# Patient Record
Sex: MALE | Race: WHITE | HISPANIC OR LATINO | Employment: UNEMPLOYED | URBAN - METROPOLITAN AREA
[De-identification: names, ages, dates, MRNs, and addresses within clinical notes are randomized per-mention and may not be internally consistent; named-entity substitution may affect disease eponyms.]

---

## 2017-01-03 ENCOUNTER — TRANSCRIBE ORDERS (OUTPATIENT)
Dept: LAB | Facility: HOSPITAL | Age: 57
End: 2017-01-03

## 2017-01-03 ENCOUNTER — APPOINTMENT (OUTPATIENT)
Dept: LAB | Facility: HOSPITAL | Age: 57
End: 2017-01-03
Attending: INTERNAL MEDICINE
Payer: COMMERCIAL

## 2017-01-03 DIAGNOSIS — I10 ESSENTIAL (PRIMARY) HYPERTENSION: ICD-10-CM

## 2017-01-03 LAB
ALBUMIN SERPL BCP-MCNC: 3.9 G/DL (ref 3.5–5)
ALP SERPL-CCNC: 53 U/L (ref 46–116)
ALT SERPL W P-5'-P-CCNC: 42 U/L (ref 12–78)
ANION GAP SERPL CALCULATED.3IONS-SCNC: 8 MMOL/L (ref 4–13)
AST SERPL W P-5'-P-CCNC: 24 U/L (ref 5–45)
BILIRUB SERPL-MCNC: 0.84 MG/DL (ref 0.2–1)
BUN SERPL-MCNC: 14 MG/DL (ref 5–25)
CALCIUM SERPL-MCNC: 8.7 MG/DL (ref 8.3–10.1)
CHLORIDE SERPL-SCNC: 99 MMOL/L (ref 100–108)
CHOLEST SERPL-MCNC: 305 MG/DL (ref 50–200)
CO2 SERPL-SCNC: 28 MMOL/L (ref 21–32)
CREAT SERPL-MCNC: 0.87 MG/DL (ref 0.6–1.3)
GFR SERPL CREATININE-BSD FRML MDRD: >60 ML/MIN/1.73SQ M
GLUCOSE SERPL-MCNC: 236 MG/DL (ref 65–140)
HDLC SERPL-MCNC: 21 MG/DL (ref 40–60)
LDLC SERPL DIRECT ASSAY-MCNC: 87 MG/DL (ref 0–100)
POTASSIUM SERPL-SCNC: 3.6 MMOL/L (ref 3.5–5.3)
PROT SERPL-MCNC: 7.5 G/DL (ref 6.4–8.2)
SODIUM SERPL-SCNC: 135 MMOL/L (ref 136–145)
TRIGL SERPL-MCNC: 2439 MG/DL

## 2017-01-03 PROCEDURE — 80053 COMPREHEN METABOLIC PANEL: CPT

## 2017-01-03 PROCEDURE — 80061 LIPID PANEL: CPT

## 2017-01-03 PROCEDURE — 83721 ASSAY OF BLOOD LIPOPROTEIN: CPT

## 2017-01-03 PROCEDURE — 36415 COLL VENOUS BLD VENIPUNCTURE: CPT

## 2017-01-09 ENCOUNTER — ALLSCRIPTS OFFICE VISIT (OUTPATIENT)
Dept: OTHER | Facility: OTHER | Age: 57
End: 2017-01-09

## 2017-01-09 DIAGNOSIS — I10 ESSENTIAL (PRIMARY) HYPERTENSION: ICD-10-CM

## 2017-03-22 ENCOUNTER — HOSPITAL ENCOUNTER (OUTPATIENT)
Dept: SLEEP CENTER | Facility: CLINIC | Age: 57
Discharge: HOME | End: 2017-03-22
Payer: COMMERCIAL

## 2017-03-22 ENCOUNTER — TRANSCRIBE ORDERS (OUTPATIENT)
Dept: SLEEP CENTER | Facility: CLINIC | Age: 57
End: 2017-03-22

## 2017-03-22 DIAGNOSIS — G47.33 OSA (OBSTRUCTIVE SLEEP APNEA): Primary | ICD-10-CM

## 2017-03-22 DIAGNOSIS — G47.33 OSA (OBSTRUCTIVE SLEEP APNEA): ICD-10-CM

## 2017-03-31 ENCOUNTER — APPOINTMENT (OUTPATIENT)
Dept: LAB | Facility: HOSPITAL | Age: 57
End: 2017-03-31
Attending: INTERNAL MEDICINE
Payer: COMMERCIAL

## 2017-03-31 ENCOUNTER — TRANSCRIBE ORDERS (OUTPATIENT)
Dept: LAB | Facility: HOSPITAL | Age: 57
End: 2017-03-31

## 2017-03-31 DIAGNOSIS — I10 ESSENTIAL (PRIMARY) HYPERTENSION: ICD-10-CM

## 2017-03-31 LAB
ALBUMIN SERPL BCP-MCNC: 4 G/DL (ref 3.5–5)
ALP SERPL-CCNC: 45 U/L (ref 46–116)
ALT SERPL W P-5'-P-CCNC: 38 U/L (ref 12–78)
ANION GAP SERPL CALCULATED.3IONS-SCNC: 7 MMOL/L (ref 4–13)
AST SERPL W P-5'-P-CCNC: 18 U/L (ref 5–45)
BILIRUB SERPL-MCNC: 0.78 MG/DL (ref 0.2–1)
BUN SERPL-MCNC: 18 MG/DL (ref 5–25)
CALCIUM SERPL-MCNC: 8.8 MG/DL (ref 8.3–10.1)
CHLORIDE SERPL-SCNC: 106 MMOL/L (ref 100–108)
CHOLEST SERPL-MCNC: 133 MG/DL (ref 50–200)
CO2 SERPL-SCNC: 29 MMOL/L (ref 21–32)
CREAT SERPL-MCNC: 0.99 MG/DL (ref 0.6–1.3)
GFR SERPL CREATININE-BSD FRML MDRD: >60 ML/MIN/1.73SQ M
GLUCOSE P FAST SERPL-MCNC: 162 MG/DL (ref 65–99)
HDLC SERPL-MCNC: 28 MG/DL (ref 40–60)
LDLC SERPL CALC-MCNC: 51 MG/DL (ref 0–100)
POTASSIUM SERPL-SCNC: 3.4 MMOL/L (ref 3.5–5.3)
PROT SERPL-MCNC: 7.2 G/DL (ref 6.4–8.2)
SODIUM SERPL-SCNC: 142 MMOL/L (ref 136–145)
TRIGL SERPL-MCNC: 272 MG/DL

## 2017-03-31 PROCEDURE — 80061 LIPID PANEL: CPT

## 2017-03-31 PROCEDURE — 80053 COMPREHEN METABOLIC PANEL: CPT

## 2017-03-31 PROCEDURE — 36415 COLL VENOUS BLD VENIPUNCTURE: CPT

## 2017-04-07 ENCOUNTER — ALLSCRIPTS OFFICE VISIT (OUTPATIENT)
Dept: OTHER | Facility: OTHER | Age: 57
End: 2017-04-07

## 2017-04-07 DIAGNOSIS — I10 ESSENTIAL (PRIMARY) HYPERTENSION: ICD-10-CM

## 2017-04-07 DIAGNOSIS — R73.01 IMPAIRED FASTING GLUCOSE: ICD-10-CM

## 2017-05-19 ENCOUNTER — TRANSCRIBE ORDERS (OUTPATIENT)
Dept: LAB | Facility: HOSPITAL | Age: 57
End: 2017-05-19

## 2017-05-19 DIAGNOSIS — I10 ESSENTIAL HYPERTENSION, BENIGN: ICD-10-CM

## 2017-05-19 DIAGNOSIS — Z00.00 ROUTINE GENERAL MEDICAL EXAMINATION AT A HEALTH CARE FACILITY: ICD-10-CM

## 2017-05-19 DIAGNOSIS — E11.9 TYPE 2 DIABETES MELLITUS WITHOUT COMPLICATION, WITHOUT LONG-TERM CURRENT USE OF INSULIN (HCC): Primary | ICD-10-CM

## 2017-05-19 DIAGNOSIS — E78.5 OTHER AND UNSPECIFIED HYPERLIPIDEMIA: ICD-10-CM

## 2017-05-24 ENCOUNTER — APPOINTMENT (OUTPATIENT)
Dept: LAB | Facility: HOSPITAL | Age: 57
End: 2017-05-24
Attending: FAMILY MEDICINE
Payer: COMMERCIAL

## 2017-05-24 ENCOUNTER — TRANSCRIBE ORDERS (OUTPATIENT)
Dept: LAB | Facility: HOSPITAL | Age: 57
End: 2017-05-24

## 2017-05-24 DIAGNOSIS — I10 ESSENTIAL HYPERTENSION, BENIGN: ICD-10-CM

## 2017-05-24 DIAGNOSIS — R07.9 CHEST PAIN, UNSPECIFIED TYPE: Primary | ICD-10-CM

## 2017-05-24 DIAGNOSIS — E11.9 TYPE 2 DIABETES MELLITUS WITHOUT COMPLICATION, WITHOUT LONG-TERM CURRENT USE OF INSULIN (HCC): ICD-10-CM

## 2017-05-24 DIAGNOSIS — E78.5 OTHER AND UNSPECIFIED HYPERLIPIDEMIA: ICD-10-CM

## 2017-05-24 DIAGNOSIS — Z00.00 ROUTINE GENERAL MEDICAL EXAMINATION AT A HEALTH CARE FACILITY: ICD-10-CM

## 2017-05-24 LAB
25(OH)D3 SERPL-MCNC: 12.6 NG/ML (ref 30–100)
ALBUMIN SERPL BCP-MCNC: 3.9 G/DL (ref 3.5–5)
ALP SERPL-CCNC: 40 U/L (ref 46–116)
ALT SERPL W P-5'-P-CCNC: 30 U/L (ref 12–78)
ANION GAP SERPL CALCULATED.3IONS-SCNC: 5 MMOL/L (ref 4–13)
AST SERPL W P-5'-P-CCNC: 16 U/L (ref 5–45)
BILIRUB SERPL-MCNC: 0.93 MG/DL (ref 0.2–1)
BUN SERPL-MCNC: 16 MG/DL (ref 5–25)
CALCIUM SERPL-MCNC: 9 MG/DL (ref 8.3–10.1)
CHLORIDE SERPL-SCNC: 105 MMOL/L (ref 100–108)
CHOLEST SERPL-MCNC: 154 MG/DL (ref 50–200)
CO2 SERPL-SCNC: 29 MMOL/L (ref 21–32)
CREAT SERPL-MCNC: 0.97 MG/DL (ref 0.6–1.3)
CRP SERPL HS-MCNC: <0.9 MG/L
EST. AVERAGE GLUCOSE BLD GHB EST-MCNC: 143 MG/DL
GFR SERPL CREATININE-BSD FRML MDRD: >60 ML/MIN/1.73SQ M
GLUCOSE P FAST SERPL-MCNC: 137 MG/DL (ref 65–99)
HBA1C MFR BLD: 6.6 % (ref 4.2–6.3)
HDLC SERPL-MCNC: 26 MG/DL (ref 40–60)
LDLC SERPL CALC-MCNC: 49 MG/DL (ref 0–100)
POTASSIUM SERPL-SCNC: 3.6 MMOL/L (ref 3.5–5.3)
PROT SERPL-MCNC: 7.3 G/DL (ref 6.4–8.2)
SODIUM SERPL-SCNC: 139 MMOL/L (ref 136–145)
T3FREE SERPL-MCNC: 3.4 PG/ML (ref 2.3–4.2)
T4 SERPL-MCNC: 6.5 UG/DL (ref 4.7–13.3)
TRIGL SERPL-MCNC: 395 MG/DL
TSH SERPL DL<=0.05 MIU/L-ACNC: 4.78 UIU/ML (ref 0.36–3.74)

## 2017-05-24 PROCEDURE — 84403 ASSAY OF TOTAL TESTOSTERONE: CPT

## 2017-05-24 PROCEDURE — 84402 ASSAY OF FREE TESTOSTERONE: CPT

## 2017-05-24 PROCEDURE — 80053 COMPREHEN METABOLIC PANEL: CPT

## 2017-05-24 PROCEDURE — 84154 ASSAY OF PSA FREE: CPT

## 2017-05-24 PROCEDURE — 84443 ASSAY THYROID STIM HORMONE: CPT

## 2017-05-24 PROCEDURE — 84436 ASSAY OF TOTAL THYROXINE: CPT

## 2017-05-24 PROCEDURE — 83036 HEMOGLOBIN GLYCOSYLATED A1C: CPT

## 2017-05-24 PROCEDURE — 80061 LIPID PANEL: CPT

## 2017-05-24 PROCEDURE — 82306 VITAMIN D 25 HYDROXY: CPT

## 2017-05-24 PROCEDURE — 84481 FREE ASSAY (FT-3): CPT

## 2017-05-24 PROCEDURE — 36415 COLL VENOUS BLD VENIPUNCTURE: CPT

## 2017-05-24 PROCEDURE — 82542 COL CHROMOTOGRAPHY QUAL/QUAN: CPT

## 2017-05-24 PROCEDURE — 84153 ASSAY OF PSA TOTAL: CPT

## 2017-05-24 PROCEDURE — 86141 C-REACTIVE PROTEIN HS: CPT

## 2017-05-25 LAB
PSA FREE MFR SERPL: 22 %
PSA FREE SERPL-MCNC: 0.11 NG/ML
PSA SERPL-MCNC: 0.5 NG/ML (ref 0–4)
TESTOST FREE SERPL-MCNC: 9.9 PG/ML (ref 7.2–24)
TESTOST SERPL-MCNC: 204 NG/DL (ref 348–1197)
TESTOSTERONE COMMENT: ABNORMAL

## 2017-06-01 LAB — MISCELLANEOUS LAB TEST RESULT: NORMAL

## 2017-07-06 ENCOUNTER — HOSPITAL ENCOUNTER (OUTPATIENT)
Dept: NON INVASIVE DIAGNOSTICS | Facility: CLINIC | Age: 57
Discharge: HOME | End: 2017-07-06
Payer: COMMERCIAL

## 2017-07-06 DIAGNOSIS — R07.9 CHEST PAIN, UNSPECIFIED TYPE: ICD-10-CM

## 2017-07-06 LAB
ARRHY DURING EX: NORMAL
CHEST PAIN STATEMENT: NORMAL
MAX DIASTOLIC BP: 86 MMHG
MAX HEART RATE: 151 BPM
MAX PREDICTED HEART RATE: 164 BPM
MAX. SYSTOLIC BP: 198 MMHG
PROTOCOL NAME: NORMAL
TARGET HR FORMULA: NORMAL
TIME IN EXERCISE PHASE: 480 S

## 2017-07-06 PROCEDURE — 93350 STRESS TTE ONLY: CPT

## 2017-07-15 ENCOUNTER — TRANSCRIBE ORDERS (OUTPATIENT)
Dept: LAB | Facility: HOSPITAL | Age: 57
End: 2017-07-15

## 2017-07-15 ENCOUNTER — APPOINTMENT (OUTPATIENT)
Dept: LAB | Facility: HOSPITAL | Age: 57
End: 2017-07-15
Attending: FAMILY MEDICINE
Payer: COMMERCIAL

## 2017-07-15 DIAGNOSIS — E03.9 UNSPECIFIED HYPOTHYROIDISM: ICD-10-CM

## 2017-07-15 DIAGNOSIS — E03.9 UNSPECIFIED HYPOTHYROIDISM: Primary | ICD-10-CM

## 2017-07-15 LAB — TSH SERPL DL<=0.05 MIU/L-ACNC: 2.42 UIU/ML (ref 0.36–3.74)

## 2017-07-15 PROCEDURE — 84443 ASSAY THYROID STIM HORMONE: CPT

## 2017-07-15 PROCEDURE — 36415 COLL VENOUS BLD VENIPUNCTURE: CPT

## 2017-10-23 ENCOUNTER — GENERIC CONVERSION - ENCOUNTER (OUTPATIENT)
Dept: OTHER | Facility: OTHER | Age: 57
End: 2017-10-23

## 2017-10-23 ENCOUNTER — HOSPITAL ENCOUNTER (EMERGENCY)
Facility: HOSPITAL | Age: 57
Discharge: HOME | End: 2017-10-23
Attending: EMERGENCY MEDICINE | Admitting: EMERGENCY MEDICINE
Payer: COMMERCIAL

## 2017-10-23 ENCOUNTER — APPOINTMENT (EMERGENCY)
Dept: RADIOLOGY | Facility: HOSPITAL | Age: 57
End: 2017-10-23
Payer: COMMERCIAL

## 2017-10-23 VITALS
OXYGEN SATURATION: 98 % | BODY MASS INDEX: 29.25 KG/M2 | WEIGHT: 182 LBS | HEART RATE: 68 BPM | RESPIRATION RATE: 18 BRPM | HEIGHT: 66 IN | SYSTOLIC BLOOD PRESSURE: 147 MMHG | DIASTOLIC BLOOD PRESSURE: 86 MMHG | TEMPERATURE: 98.2 F

## 2017-10-23 DIAGNOSIS — R07.89 ATYPICAL CHEST PAIN: Primary | ICD-10-CM

## 2017-10-23 LAB
ALBUMIN SERPL BCP-MCNC: 4.4 G/DL (ref 3.5–5)
ALP SERPL-CCNC: 41 U/L (ref 46–116)
ALT SERPL W P-5'-P-CCNC: 46 U/L (ref 12–78)
ANION GAP SERPL CALCULATED.3IONS-SCNC: 5 MMOL/L (ref 4–13)
AST SERPL W P-5'-P-CCNC: 32 U/L (ref 5–45)
ATRIAL RATE: 32 BPM
BASOPHILS # BLD AUTO: 0.04 THOUSANDS/ΜL (ref 0–0.1)
BASOPHILS NFR BLD AUTO: 1 % (ref 0–1)
BILIRUB SERPL-MCNC: 0.69 MG/DL (ref 0.2–1)
BUN SERPL-MCNC: 23 MG/DL (ref 5–25)
CALCIUM SERPL-MCNC: 10 MG/DL (ref 8.3–10.1)
CHLORIDE SERPL-SCNC: 104 MMOL/L (ref 100–108)
CO2 SERPL-SCNC: 27 MMOL/L (ref 21–32)
CREAT SERPL-MCNC: 1.07 MG/DL (ref 0.6–1.3)
EOSINOPHIL # BLD AUTO: 0.21 THOUSAND/ΜL (ref 0–0.61)
EOSINOPHIL NFR BLD AUTO: 3 % (ref 0–6)
ERYTHROCYTE [DISTWIDTH] IN BLOOD BY AUTOMATED COUNT: 11.9 % (ref 11.6–15.1)
GFR SERPL CREATININE-BSD FRML MDRD: 77 ML/MIN/1.73SQ M
GLUCOSE SERPL-MCNC: 151 MG/DL (ref 65–140)
HCT VFR BLD AUTO: 37.9 % (ref 36.5–49.3)
HGB BLD-MCNC: 13.4 G/DL (ref 12–17)
LYMPHOCYTES # BLD AUTO: 2.26 THOUSANDS/ΜL (ref 0.6–4.47)
LYMPHOCYTES NFR BLD AUTO: 31 % (ref 14–44)
MCH RBC QN AUTO: 33.7 PG (ref 26.8–34.3)
MCHC RBC AUTO-ENTMCNC: 35.4 G/DL (ref 31.4–37.4)
MCV RBC AUTO: 95 FL (ref 82–98)
MONOCYTES # BLD AUTO: 0.44 THOUSAND/ΜL (ref 0.17–1.22)
MONOCYTES NFR BLD AUTO: 6 % (ref 4–12)
NEUTROPHILS # BLD AUTO: 4.38 THOUSANDS/ΜL (ref 1.85–7.62)
NEUTS SEG NFR BLD AUTO: 59 % (ref 43–75)
NRBC BLD AUTO-RTO: 0 /100 WBCS
P AXIS: 88 DEGREES
PLATELET # BLD AUTO: 247 THOUSANDS/UL (ref 149–390)
PMV BLD AUTO: 10.1 FL (ref 8.9–12.7)
POTASSIUM SERPL-SCNC: 4.1 MMOL/L (ref 3.5–5.3)
PROT SERPL-MCNC: 8.4 G/DL (ref 6.4–8.2)
QRS AXIS: -7 DEGREES
QRSD INTERVAL: 100 MS
QT INTERVAL: 398 MS
QTC INTERVAL: 407 MS
RBC # BLD AUTO: 3.98 MILLION/UL (ref 3.88–5.62)
SODIUM SERPL-SCNC: 136 MMOL/L (ref 136–145)
SPECIMEN SOURCE: NORMAL
SPECIMEN SOURCE: NORMAL
T WAVE AXIS: 34 DEGREES
TROPONIN I BLD-MCNC: 0 NG/ML (ref 0–0.08)
TROPONIN I BLD-MCNC: 0 NG/ML (ref 0–0.08)
TROPONIN I SERPL-MCNC: <0.02 NG/ML
VENTRICULAR RATE: 63 BPM
WBC # BLD AUTO: 7.37 THOUSAND/UL (ref 4.31–10.16)

## 2017-10-23 PROCEDURE — 84484 ASSAY OF TROPONIN QUANT: CPT

## 2017-10-23 PROCEDURE — 99285 EMERGENCY DEPT VISIT HI MDM: CPT

## 2017-10-23 PROCEDURE — 36415 COLL VENOUS BLD VENIPUNCTURE: CPT

## 2017-10-23 PROCEDURE — 84484 ASSAY OF TROPONIN QUANT: CPT | Performed by: EMERGENCY MEDICINE

## 2017-10-23 PROCEDURE — 85025 COMPLETE CBC W/AUTO DIFF WBC: CPT | Performed by: EMERGENCY MEDICINE

## 2017-10-23 PROCEDURE — 80053 COMPREHEN METABOLIC PANEL: CPT | Performed by: EMERGENCY MEDICINE

## 2017-10-23 PROCEDURE — 71020 HB CHEST X-RAY 2VW FRONTAL&LATL: CPT | Performed by: EMERGENCY MEDICINE

## 2017-10-23 PROCEDURE — 93005 ELECTROCARDIOGRAM TRACING: CPT | Performed by: EMERGENCY MEDICINE

## 2017-10-23 RX ORDER — FENOFIBRATE 145 MG/1
145 TABLET, COATED ORAL
COMMUNITY
End: 2018-08-22 | Stop reason: SDUPTHER

## 2017-10-23 RX ORDER — PRAVASTATIN SODIUM 20 MG
20 TABLET ORAL DAILY
COMMUNITY
Start: 2017-04-07 | End: 2018-06-11 | Stop reason: SINTOL

## 2017-10-23 RX ORDER — LISINOPRIL 40 MG/1
40 TABLET ORAL DAILY
COMMUNITY
Start: 2012-09-13 | End: 2019-12-10 | Stop reason: SDUPTHER

## 2017-10-23 RX ORDER — METOPROLOL TARTRATE 50 MG/1
50 TABLET, FILM COATED ORAL
COMMUNITY
Start: 2012-09-13 | End: 2018-07-11 | Stop reason: SDUPTHER

## 2017-10-23 RX ORDER — LATANOPROST 50 UG/ML
1 SOLUTION/ DROPS OPHTHALMIC
COMMUNITY

## 2017-10-23 NOTE — DISCHARGE INSTRUCTIONS

## 2017-10-23 NOTE — ED PROVIDER NOTES
History  Chief Complaint   Patient presents with    Chest Pain     Pt c/o intermittent stabbing chest pain beginning one week ago  Denies NVD  Pain does not radiate     This is a 71-year-old male with a past medical history of hypothyroidism, hypertension, hyperlipidemia, diabetes, and an MI in 2006 who presents to the emergency room this afternoon with left-sided chest pain for the past few months  Patient states that the pain has come and gone and his left chest and he has seen his cardiologist and family doctor for this  He had a stress echo done two months ago that was normal  Patient states that over the past week the pain is have become more frequent and and then today the pain has occurred twice in the same day and where the most painful they have been  The pains are not associated with any shortness of breath, diaphoresis, nausea/vomiting, or any upper extremity or neck pain  He after the pains occurred today the patient decided to go to work anyway and while he was at work he was talking to a co-worker who talked him into coming to the emergency room  Patient denies any recent current fevers, chills, headache, change in vision, sore throat, abdominal pain, nausea/vomiting, he dysuria, hematuria, diarrhea, constipation, or any numbness/weakness/tingling  Prior to Admission Medications   Prescriptions Last Dose Informant Patient Reported? Taking?    Multiple Vitamins-Minerals (MULTIVITAMIN ADULT PO) 10/23/2017 at 0930  Yes Yes   Sig: Take 1 tablet by mouth daily   aspirin 81 MG tablet 10/23/2017 at 0930  Yes Yes   Sig: Take 81 mg by mouth daily   fenofibrate (TRICOR) 145 mg tablet 10/23/2017 at 1230  Yes Yes   Sig: Take 145 mg by mouth daily at bedtime   latanoprost (XALATAN) 0 005 % ophthalmic solution 10/21/2017 at 2100  Yes Yes   Sig: Apply 1 drop to eye daily at bedtime   lisinopril (ZESTRIL) 40 mg tablet 10/23/2017 at 0930  Yes Yes   Sig: Take 40 mg by mouth daily   metFORMIN (GLUCOPHAGE) 500 mg tablet 10/23/2017 at 0930  Yes Yes   Sig: Take 500 mg by mouth daily with breakfast   metoprolol tartrate (LOPRESSOR) 50 mg tablet 10/23/2017 at 0930  Yes Yes   Sig: Take 50 mg by mouth   pravastatin (PRAVACHOL) 20 mg tablet 10/23/2017 at 0930  Yes Yes   Sig: Take 20 mg by mouth daily      Facility-Administered Medications: None       Past Medical History:   Diagnosis Date    Cardiac disease     Diabetes mellitus ([facility] [address] )     Hyperlipidemia     Hypertension     Myocardial infarct     2006       History reviewed  No pertinent surgical history  History reviewed  No pertinent family history  I have reviewed and agree with the history as documented  Social History   Substance Use Topics    Smoking status: Current Every Day Smoker     Packs/day: 0 25    Smokeless tobacco: Never Used    Alcohol use Yes      Comment: occassionally        Review of Systems   Constitutional: Negative for chills, diaphoresis and fever  HENT: Negative for congestion, rhinorrhea, sinus pressure and sore throat  Eyes: Negative for visual disturbance  Respiratory: Negative for cough, chest tightness and shortness of breath  Cardiovascular: Positive for chest pain  Gastrointestinal: Negative for abdominal pain, constipation, diarrhea, nausea and vomiting  Genitourinary: Negative for dysuria, frequency, hematuria and urgency  Musculoskeletal: Negative for arthralgias and myalgias  Skin: Negative for color change and rash  Neurological: Negative for dizziness, numbness and headaches  Physical Exam  ED Triage Vitals [10/23/17 1333]   Temperature Pulse Respirations Blood Pressure SpO2   98 2 °F (36 8 °C) 70 18 163/81 99 %      Temp src Heart Rate Source Patient Position - Orthostatic VS BP Location FiO2 (%)   -- Monitor Sitting Right arm --      Pain Score       8           Physical Exam   Constitutional: He is oriented to person, place, and time  He appears well-developed and well-nourished  No distress  HENT:   Head: Normocephalic and atraumatic  Eyes: Conjunctivae are normal  Pupils are equal, round, and reactive to light  Cardiovascular: Normal rate, regular rhythm and normal heart sounds  Exam reveals no gallop and no friction rub  No murmur heard  Pulmonary/Chest: Effort normal and breath sounds normal  No respiratory distress  He has no wheezes  He has no rales  Abdominal: Soft  Bowel sounds are normal  He exhibits no distension  There is no tenderness  There is no guarding  Musculoskeletal: Normal range of motion  Neurological: He is alert and oriented to person, place, and time  Skin: Skin is warm and dry  Psychiatric: He has a normal mood and affect  His behavior is normal    Nursing note and vitals reviewed  ED Medications  Medications - No data to display    Diagnostic Studies  Labs Reviewed   COMPREHENSIVE METABOLIC PANEL - Abnormal        Result Value Ref Range Status    Glucose 151 (*) 65 - 140 mg/dL Final    Comment:   If the patient is fasting, the ADA then defines impaired fasting glucose as > 100 mg/dL and diabetes as > or equal to 123 mg/dL  Specimen collection should occur prior to Sulfasalazine administration due to the potential for falsely depressed results  Specimen collection should occur prior to Sulfapyridine administration due to the potential for falsely elevated results  Alkaline Phosphatase 41 (*) 46 - 116 U/L Final    Total Protein 8 4 (*) 6 4 - 8 2 g/dL Final    Sodium 136  136 - 145 mmol/L Final    Potassium 4 1  3 5 - 5 3 mmol/L Final    Comment: Slightly Hemolyzed; Results May be Affected    Chloride 104  100 - 108 mmol/L Final    CO2 27  21 - 32 mmol/L Final    Anion Gap 5  4 - 13 mmol/L Final    BUN 23  5 - 25 mg/dL Final    Creatinine 1 07  0 60 - 1 30 mg/dL Final    Comment: Standardized to IDMS reference method    Calcium 10 0  8 3 - 10 1 mg/dL Final    AST 32  5 - 45 U/L Final    Comment: Slightly Hemolyzed;  Results May be Affected  Specimen collection should occur prior to Sulfasalazine administration due to the potential for falsely depressed results  ALT 46  12 - 78 U/L Final    Comment:   Specimen collection should occur prior to Sulfasalazine and/or Sulfapyridine administration due to the potential for falsely depressed results  Albumin 4 4  3 5 - 5 0 g/dL Final    Total Bilirubin 0 69  0 20 - 1 00 mg/dL Final    eGFR 77  ml/min/1 73sq m Final    Narrative:     National Kidney Disease Education Program recommendations are as follows:  GFR calculation is accurate only with a steady state creatinine  Chronic Kidney disease less than 60 ml/min/1 73 sq  meters  Kidney failure less than 15 ml/min/1 73 sq  meters     CBC AND DIFFERENTIAL - Normal    WBC 7 37  4 31 - 10 16 Thousand/uL Final    RBC 3 98  3 88 - 5 62 Million/uL Final    Hemoglobin 13 4  12 0 - 17 0 g/dL Final    Hematocrit 37 9  36 5 - 49 3 % Final    MCV 95  82 - 98 fL Final    MCH 33 7  26 8 - 34 3 pg Final    MCHC 35 4  31 4 - 37 4 g/dL Final    RDW 11 9  11 6 - 15 1 % Final    MPV 10 1  8 9 - 12 7 fL Final    Platelets 112  650 - 390 Thousands/uL Final    nRBC 0  /100 WBCs Final    Neutrophils Relative 59  43 - 75 % Final    Lymphocytes Relative 31  14 - 44 % Final    Monocytes Relative 6  4 - 12 % Final    Eosinophils Relative 3  0 - 6 % Final    Basophils Relative 1  0 - 1 % Final    Neutrophils Absolute 4 38  1 85 - 7 62 Thousands/µL Final    Lymphocytes Absolute 2 26  0 60 - 4 47 Thousands/µL Final    Monocytes Absolute 0 44  0 17 - 1 22 Thousand/µL Final    Eosinophils Absolute 0 21  0 00 - 0 61 Thousand/µL Final    Basophils Absolute 0 04  0 00 - 0 10 Thousands/µL Final   TROPONIN I - Normal    Troponin I <0 02  <=0 04 ng/mL Final    Narrative:     [employer] Chemistry analyzer 99% cutoff is > 0 04 ng/mL in network labs    o cTnI 99% cutoff is useful only when applied to patients in the clinical setting of myocardial ischemia  o cTnI 99% cutoff should be interpreted in the context of clinical history, ECG findings and possibly cardiac imaging to establish correct diagnosis  o cTnI 99% cutoff may be suggestive but clearly not indicative of a coronary event without the clinical setting of myocardial ischemia  POCT TROPONIN - Normal    POC Troponin I 0 00  0 00 - 0 08 ng/ml Final    Specimen Type VENOUS   Final    Narrative:     [employer] i-Stat handheld analyzer 99% cutoff is > 0 08ng/mL in [facility] Emergency Departments    o cTnI 99% cutoff is useful only when applied to patients in the clinical setting of myocardial ischemia  o cTnI 99% cutoff should be interpreted in the context of clinical history, ECG findings and possibly cardiac imaging to establish correct diagnosis  o cTnI 99% cutoff may be suggestive but clearly not indicative of a coronary event without the clinical setting of myocardial ischemia  POCT TROPONIN - Normal    POC Troponin I 0 00  0 00 - 0 08 ng/ml Final    Specimen Type VENOUS   Final    Narrative:     [employer] i-Stat handheld analyzer 99% cutoff is > 0 08ng/mL in [facility] Emergency Departments    o cTnI 99% cutoff is useful only when applied to patients in the clinical setting of myocardial ischemia  o cTnI 99% cutoff should be interpreted in the context of clinical history, ECG findings and possibly cardiac imaging to establish correct diagnosis  o cTnI 99% cutoff may be suggestive but clearly not indicative of a coronary event without the clinical setting of myocardial ischemia  LIGHT BLUE TOP   GOLD TOP ON HOLD       X-ray chest 2 views   ED Interpretation   No acute cardiopulmonary process is identified  Final Result      No active pulmonary disease           Workstation performed: [id number]             Procedures  ECG 12 Lead Documentation  Date/Time: 10/23/2017 2:32 PM  Performed by: Alexys Thomas  Authorized by: Brandee Coon     Indications / Diagnosis:  Chest pain  ECG reviewed by me, the ED Provider: yes    Patient location: ED  Previous ECG:     Previous ECG:  Compared to current    Similarity:  No change    Comparison to cardiac monitor: Yes    Interpretation:     Interpretation: normal    Rate:     ECG rate assessment: normal    Rhythm:     Rhythm: sinus rhythm    Ectopy:     Ectopy: none    QRS:     QRS axis:  Normal    QRS intervals:  Normal  Conduction:     Conduction: normal    ST segments:     ST segments:  Normal  T waves:     T waves: normal            Phone Consults  ED Phone Contact    ED Course  ED Course          HEART Risk Score    Flowsheet Row Most Recent Value   History  1 Filed at: 10/23/2017 1420   ECG  0 Filed at: 10/23/2017 1420   Age  1 Filed at: 10/23/2017 1420   Risk Factors  2 Filed at: 10/23/2017 1420   Troponin  0 Filed at: 10/23/2017 1420   Heart Score Risk Calculator   History  1 Filed at: 10/23/2017 1420   ECG  0 Filed at: 10/23/2017 1420   Age  1 Filed at: 10/23/2017 1420   Risk Factors  2 Filed at: 10/23/2017 1420   Troponin  0 Filed at: 10/23/2017 1420   HEART Score  4 Filed at: 10/23/2017 1420   HEART Score  4 Filed at: 10/23/2017 1420                            MDM  Number of Diagnoses or Management Options  Atypical chest pain:   Diagnosis management comments: The patient has no history of cardiac catheterization  Spoke with Cardiology about whether not they would like to do one inpatient or follow up as an outpatient and they recommended he follow up as an outpatient  Patient is felt to troponin was negative and was able to be discharged home in good condition  He states that he has a follow-up appointment with Cardiology on October 25th and a follow-up with his primary care physician on October 27th  CritCare Time    Disposition  Final diagnoses:   Atypical chest pain     ED Disposition     ED Disposition Condition Comment    [facility]charge  [facility] discharge to home/self care      Condition at discharge: Good        Follow-up Information     Follow up With Specialties Details Why Contact Info Chapo Vega MD Family Medicine   [address]  [address]      [address] [facility] [place]    [address] [facility]  [phone]        Patient's Medications   Discharge Prescriptions    No medications on file     No discharge procedures on file  ED Provider  Attending physically available and evaluated Ally Dears  I managed the patient along with the ED Attending      Electronically Signed by       Kym Oleary MD  Resident  10/23/17 6604

## 2017-10-23 NOTE — ED NOTES
Patient transported to [facility] [address], RN  10/23/17 8519

## 2017-10-25 ENCOUNTER — ALLSCRIPTS OFFICE VISIT (OUTPATIENT)
Dept: OTHER | Facility: OTHER | Age: 57
End: 2017-10-25

## 2017-10-26 NOTE — PROGRESS NOTES
Assessment  Assessed    1  Essential hypertriglyceridemia (272 1) (E78 1)   2  Hypertension (401 9) (I10)   3  Chest pain, atypical (786 59) (R07 89)    Plan  Chest pain, atypical    · Follow-up visit in 1 month Evaluation and Treatment  Follow-up  Status: Complete  Done:  [id number]   Ordered; For: Chest pain, atypical; Ordered By: Cortney Herrmann Performed:  Due: 47OSF6439; Last Updated By: Massiel De Leon; 10/25/2017 9:54:00 AM  Unlinked    · Lipitor TABS (Atorvastatin Calcium)   Dispense: 0 Days ; #: Sufficient TABS; Refill: 0; COTY = N; Record; Last Updated By: Cortney Herrmann; 10/25/2017 9:52:43 AM    Discussion/Summary  Cardiology Discussion Summary Free Text Note Form [facility]:   62year old man with essential hypertriglyceridemia and hypertension who presents for follow up  Has pinching sensation in left chest when lifting heavy objects  Lasts for a second at a time  Also has weakness throughout body  Essential hypertriglyceridemia - likely symptoms due to atorvastatin or fenofibrate  Hypertension - good control  Pinching sensation in chest - likely neurologic/due to nerve impingement  Stop atorvastatin and fenofibrate  Continue remainder of medications  Follow up in one month  Chief Complaint  Chief Complaint Free Text Note Form: Pt here for a 6 month f/u  Pt complains of body pains  Chief Complaint Chronic Condition [facility]: Patient is here today for follow up of chronic conditions described in HPI  History of Present Illness  Cardiology HPI Free Text Note Form [facility]: Mr Dave Staton is a 62year old man with essential hypertriglyceridemia and hypertension who presents for follow up  Has pinching sensation in left chest when lifting heavy objects  Lasts for a second at a time  Also has weakness throughout body  Review of Systems  Cardiology Male ROS:     Cardiac: chest pain  Skin: No complaints of nonhealing sores or skin rash     Genitourinary: No complaints of recurrent urinary tract infections, frequent urination at night, difficult urination, blood in urine, kidney stones, loss of bladder control, no kidney or prostate problems, no erectile dysfunction  Psychological: No complaints of feeling depressed, anxiety, panic attacks, or difficulty concentrating  General: trouble sleeping--   Itching  Respiratory: No complaints of shortness of breath, cough with sputum, or wheezing  HEENT: No complaints of serious problems, hearing problems, nose problems, throat problems, or snoring  Gastrointestinal: No complaints of liver problems, nausea, vomiting, heartburn, constipation, bloody stools, diarrhea, problems swallowing, adbominal pain, or rectal bleeding  Hematologic: No complaints of bleeding disorders, anemia, blood clots, or excessive brusing  Neurological: No complaints of numbness, tingling, dizziness, weakness, seizures, headaches, syncope or fainting, AM fatigue, daytime sleepiness, no witnessed apnea episodes  Musculoskeletal: No complaints of arthritis, back pain, or painfull swelling  Active Problems  Problems    1  Abnormal fasting glucose (790 29) (R73 01)   2  Erectile dysfunction of non-organic origin (302 72) (F52 21)   3  Esophageal reflux (530 81) (K21 9)   4  Essential hypertriglyceridemia (272 1) (E78 1)   5  Hyperlipidemia (272 4) (E78 5)   6  Hypertension (401 9) (I10)    Past Medical History  Active Problems And Past Medical History Reviewed: The active problems and past medical history were reviewed and updated today  Surgical History  Surgical History Reviewed: The surgical history was reviewed and updated today  Family History  Mother    1  Family history of Diabetes mellitus due to underlying condition with microalbuminuria,   without long-term current use of insulin   2  Family history of diabetes mellitus (V18 0) (Z83 3)  Family History    3  Family history of Diabetes Mellitus (V18 0)  Family History Reviewed:    The family history was reviewed and updated today  Social History  Problems    · Being A Social Drinker   · Current Every Day Smoker (305 1)   · Current Smoker (305 1)  Social History Reviewed: The social history was reviewed and updated today  The social history was reviewed and is unchanged  Current Meds   1  Aspirin 81 MG TABS; TAKE 1 TABLET DAILY; Therapy: (Recorded:38Cow3677) to Recorded   2  HydroCHLOROthiazide 12 5 MG Oral Tablet; TAKE 1 TABLET DAILY; Therapy: (Recorded:41Ecw4308) to Recorded   3  Latanoprost 0 005 % Ophthalmic Solution; Therapy: (Recorded:67Uec5131) to Recorded   4  Levothyroxine Sodium TABS; Therapy: (NSVRDNLU:31PPL1821) to Recorded   5  Lipitor TABS; Therapy: (SPQHFXBR:59HPB3130) to Recorded   6  Lisinopril 40 MG Oral Tablet; Take 1 tablet daily; Therapy: 50Tix8720 to (Evaluate:19Cga3248)  Requested for: 68ZVW2593; Last   Rx:[id number] Ordered   7  MetFORMIN HCl TABS; Therapy: (TTHUVFZA:65KMP6537) to Recorded   8  Metoprolol Tartrate 50 MG Oral Tablet; take one tablet by mouth twice daily; Therapy: 37Huo5970 to (Evaluate:75Tfo0469)  Requested for: 95SCU6974; Last   Rx:70Iur9158 Ordered   9  Multi-Day Oral Tablet; Therapy: (Recorded:50Dgd9425) to Recorded  Medication List Reviewed: The medication list was reviewed and updated today  Allergies  Medication    1  No Known Drug Allergies    Vitals  Vital Signs    Recorded: 18BWR7864 09:34AM   Heart Rate 62   Systolic 330, RUE, Sitting   Diastolic 74, RUE, Sitting   Height 5 ft 6 in   Weight 182 lb 5 oz   BMI Calculated 29 43   BSA Calculated 1 92   O2 Saturation 98     Physical Exam    Constitutional   General appearance: No acute distress, well appearing and well nourished  Eyes   Conjunctiva and Sclera examination: Conjunctiva pink, sclera anicteric  Ears, Nose, Mouth, and Throat - External inspection of ears and nose: Normal without deformities or discharge  Neck   Neck and thyroid: Normal, supple, trachea midline, no thyromegaly  Pulmonary   Respiratory effort: No increased work of breathing or signs of respiratory distress  Cardiovascular   Auscultation of heart: Normal rate and rhythm, normal S1 and S2, no murmurs  Carotid pulses: Normal, 2+ bilaterally  Examination of extremities for edema and/or varicosities: Normal     Abdomen   Abdomen: Non-tender and no distention  Musculoskeletal Gait and station: Normal gait  Skin - Skin and subcutaneous tissue: Normal without rashes or lesions  Skin is warm and well perfused, normal turgor      Neurologic - Speech: Normal     Psychiatric - Orientation to person, place, and time: Normal -- Mood and affect: Normal       Signatures   Electronically signed by : ROSALINO Hart ; Oct 25 2017  9:54AM EST                       (Author)

## 2017-10-30 NOTE — ED ATTENDING ATTESTATION
Warner Mesa MD, saw and evaluated the patient  I have discussed the patient with the resident/non-physician practitioner and agree with the resident's/non-physician practitioner's findings, Plan of Care, and MDM as documented in the resident's/non-physician practitioner's note, except where noted  All available labs and Radiology studies were reviewed  At this point I agree with the current assessment done in the Emergency Department  I have conducted an independent evaluation of this patient a history and physical is as follows:  Patient here with atypical chest pain  Patient reports intermittent fleeting sharp chest pain that lasts less than a minute, and his left chest, history which is not associated with exertion  Patient has a history of hyperlipidemia, hypertension, she is well appearing review of systems otherwise negative in 12 systems were reviewed  On exam Vital signs were reviewed  Patient is awake, alert, interactive  The patient's pupils are equally round reactive to light  Oropharynx is clear with moist mucous membranes  Neck is supple and nontender with no adenopathy or JVD  Heart is regular with no murmurs, rubs, or gallops  Lungs are clear and equal with no wheezes, rales, or rhonchi  Abdomen is soft and nontender with no masses, rebound, or guarding  There is no CVA tenderness  The patient was completely exposed  There is no skin breakdown  There are no rashes or skin changes  Extremities are warm and well perfused with good pulses  The patient has normal strength, sensation, and cranial nerves  Impression:  Atypical chest pain  Admission will plan to do delta troponin, delta EKG    EKG reviewed by me, sinus rhythm with no ischemia or ectopy    Critical Care Time  CritCare Time

## 2017-12-01 ENCOUNTER — APPOINTMENT (OUTPATIENT)
Dept: LAB | Facility: HOSPITAL | Age: 57
End: 2017-12-01
Attending: INTERNAL MEDICINE
Payer: COMMERCIAL

## 2017-12-01 ENCOUNTER — TRANSCRIBE ORDERS (OUTPATIENT)
Dept: LAB | Facility: HOSPITAL | Age: 57
End: 2017-12-01

## 2017-12-01 DIAGNOSIS — R73.01 IMPAIRED FASTING GLUCOSE: ICD-10-CM

## 2017-12-01 DIAGNOSIS — I10 ESSENTIAL (PRIMARY) HYPERTENSION: ICD-10-CM

## 2017-12-01 LAB
ALBUMIN SERPL BCP-MCNC: 4.1 G/DL (ref 3.5–5)
ALP SERPL-CCNC: 39 U/L (ref 46–116)
ALT SERPL W P-5'-P-CCNC: 44 U/L (ref 12–78)
ANION GAP SERPL CALCULATED.3IONS-SCNC: 6 MMOL/L (ref 4–13)
AST SERPL W P-5'-P-CCNC: 23 U/L (ref 5–45)
BILIRUB SERPL-MCNC: 0.74 MG/DL (ref 0.2–1)
BUN SERPL-MCNC: 20 MG/DL (ref 5–25)
CALCIUM SERPL-MCNC: 9.2 MG/DL (ref 8.3–10.1)
CHLORIDE SERPL-SCNC: 104 MMOL/L (ref 100–108)
CHOLEST SERPL-MCNC: 214 MG/DL (ref 50–200)
CO2 SERPL-SCNC: 28 MMOL/L (ref 21–32)
CREAT SERPL-MCNC: 0.97 MG/DL (ref 0.6–1.3)
EST. AVERAGE GLUCOSE BLD GHB EST-MCNC: 137 MG/DL
GFR SERPL CREATININE-BSD FRML MDRD: 86 ML/MIN/1.73SQ M
GLUCOSE P FAST SERPL-MCNC: 158 MG/DL (ref 65–99)
HBA1C MFR BLD: 6.4 % (ref 4.2–6.3)
HDLC SERPL-MCNC: 29 MG/DL (ref 40–60)
LDLC SERPL DIRECT ASSAY-MCNC: 91 MG/DL (ref 0–100)
POTASSIUM SERPL-SCNC: 3.4 MMOL/L (ref 3.5–5.3)
PROT SERPL-MCNC: 7.7 G/DL (ref 6.4–8.2)
SODIUM SERPL-SCNC: 138 MMOL/L (ref 136–145)
TRIGL SERPL-MCNC: 614 MG/DL

## 2017-12-01 PROCEDURE — 83721 ASSAY OF BLOOD LIPOPROTEIN: CPT

## 2017-12-01 PROCEDURE — 80061 LIPID PANEL: CPT

## 2017-12-01 PROCEDURE — 83036 HEMOGLOBIN GLYCOSYLATED A1C: CPT

## 2017-12-01 PROCEDURE — 80053 COMPREHEN METABOLIC PANEL: CPT

## 2017-12-01 PROCEDURE — 36415 COLL VENOUS BLD VENIPUNCTURE: CPT

## 2017-12-06 ENCOUNTER — ALLSCRIPTS OFFICE VISIT (OUTPATIENT)
Dept: OTHER | Facility: OTHER | Age: 57
End: 2017-12-06

## 2017-12-06 DIAGNOSIS — R07.89 OTHER CHEST PAIN: ICD-10-CM

## 2017-12-06 DIAGNOSIS — Z00.00 ENCOUNTER FOR GENERAL ADULT MEDICAL EXAMINATION WITHOUT ABNORMAL FINDINGS: ICD-10-CM

## 2017-12-06 DIAGNOSIS — K21.9 GASTRO-ESOPHAGEAL REFLUX DISEASE WITHOUT ESOPHAGITIS: ICD-10-CM

## 2017-12-06 DIAGNOSIS — E78.5 HYPERLIPIDEMIA: ICD-10-CM

## 2017-12-06 DIAGNOSIS — F52.21 MALE ERECTILE DISORDER (CODE): ICD-10-CM

## 2017-12-06 DIAGNOSIS — R73.01 IMPAIRED FASTING GLUCOSE: ICD-10-CM

## 2017-12-06 DIAGNOSIS — E78.1 PURE HYPERGLYCERIDEMIA: ICD-10-CM

## 2017-12-07 NOTE — PROGRESS NOTES
Assessment  Assessed    1  Hyperlipidemia (272 4) (E78 5)    Plan  Abnormal fasting glucose, Chest pain, atypical, Health Maintenance, Erectile dysfunctionof non-organic origin, Esophageal reflux, Essential hypertriglyceridemia, Hyperlipidemia    · Vascepa 1 GM Oral Capsule; TAKE 4 CAPSULE Daily   Rx By: Vera Peralta; Dispense: 30 Days ; #:120 Capsule; Refill: 5;For: Abnormal fasting glucose, Chest pain, atypical, Health Maintenance, Erectile dysfunction of non-organic origin, Esophageal reflux, Essential hypertriglyceridemia, Hyperlipidemia; COTY = N; Verified Transmission to [facility]; Last Updated By: System, SureScript[employer]; 12/6/2017 10:19:25 AM   · (1) COMPREHENSIVE METABOLIC PANEL; Status:Active; Requested for:75Lez8315;    Perform:[facility]; LPR:83QVQ2365; Ordered;fasting glucose, Chest pain, atypical, Health Maintenance, Erectile dysfunction of non-organic origin, Esophageal reflux, Essential hypertriglyceridemia, Hyperlipidemia; Ordered By:Chino Spann;   · (1) LIPID PANEL FASTING W DIRECT LDL REFLEX; Status:Active; Requestedfor:52Acm1050;    Perform:[facility]; FHE:04BOO1301; Ordered;fasting glucose, Chest pain, atypical, Health Maintenance, Erectile dysfunction of non-organic origin, Esophageal reflux, Essential hypertriglyceridemia, Hyperlipidemia; Ordered By:Chino Spann;   · Follow-up visit in 6 months Evaluation and Treatment  Follow-up  Status: Complete Done: 40XPG2741   Ordered; Abnormal fasting glucose, Chest pain, atypical, Health Maintenance, Erectile dysfunction of non-organic origin, Esophageal reflux, Essential hypertriglyceridemia, Hyperlipidemia; Ordered By: Vera Peralta Performed:  Due: 28ZUY5517; Last Updated By: Masood Jimenez; 12/6/2017 10:19:36 AM    Discussion/Summary  Cardiology Discussion Summary Free Text Note Form [facility]:   62year old man with essential hypertriglyceridemia and hypertension who presents for follow up   Feels much better since stopping atorvastatin or fenofibrate  No further weakness or pinching sensations  Essential hypertriglyceridemia - off medications  Hypertension - good control  Pinching sensation in chest - improved  Start Vascepa  Continue remainder of medications  Check fasting lipid panel in March 2018  Follow up in 6 months  Chief Complaint  Chief Complaint Free Text Note Form: patient are here for follow up, patient staying he feel good  Chief Complaint Chronic Condition [facility]: Patient is here today for follow up of chronic conditions described in HPI  History of Present Illness  Cardiology HPI Free Text Note Form [facility]: Mr Jasmyne Ambrocio is a 62year old man with essential hypertriglyceridemia and hypertension who presents for follow up  Feels much better since stopping atorvastatin or fenofibrate  No further weakness or pinching sensations  Review of Systems  Cardiology Male ROS:    Cardiac: chest pain, but-- No complaints of chest pain, no palpitations, no fainiting  Skin: No complaints of nonhealing sores or skin rash  Genitourinary: No complaints of recurrent urinary tract infections, frequent urination at night, difficult urination, blood in urine, kidney stones, loss of bladder control, no kidney or prostate problems, no erectile dysfunction  Psychological: No complaints of feeling depressed, anxiety, panic attacks, or difficulty concentrating  General: trouble sleeping, but-- No complaints of trouble sleeping, lack of energy, fatigue, appetite changes, weight changes, fever, frequent infections, or night sweats  -- Itching  Respiratory: No complaints of shortness of breath, cough with sputum, or wheezing  HEENT: No complaints of serious problems, hearing problems, nose problems, throat problems, or snoring  Gastrointestinal: No complaints of liver problems, nausea, vomiting, heartburn, constipation, bloody stools, diarrhea, problems swallowing, adbominal pain, or rectal bleeding  Hematologic: No complaints of bleeding disorders, anemia, blood clots, or excessive brusing  Neurological: No complaints of numbness, tingling, dizziness, weakness, seizures, headaches, syncope or fainting, AM fatigue, daytime sleepiness, no witnessed apnea episodes  Musculoskeletal: No complaints of arthritis, back pain, or painfull swelling  Active Problems  Problems    1  Abnormal fasting glucose (790 29) (R73 01)   2  Chest pain, atypical (786 59) (R07 89)   3  Erectile dysfunction of non-organic origin (302 72) (F52 21)   4  Esophageal reflux (530 81) (K21 9)   5  Essential hypertriglyceridemia (272 1) (E78 1)   6  Hyperlipidemia (272 4) (E78 5)   7  Hypertension (401 9) (I10)    Past Medical History  Active Problems And Past Medical History Reviewed: The active problems and past medical history were reviewed and updated today  Surgical History  Surgical History Reviewed: The surgical history was reviewed and updated today  Family History  Mother    1  Family history of Diabetes mellitus due to underlying condition with microalbuminuria, without long-term current use of insulin   2  Family history of diabetes mellitus (V18 0) (Z83 3)  Family History    3  Family history of Diabetes Mellitus (V18 0)  Family History Reviewed: The family history was reviewed and updated today  Social History  Problems    · Being A Social Drinker   · Current Every Day Smoker (305 1)   · Current Smoker (305 1)  Social History Reviewed: The social history was reviewed and updated today  The social history was reviewed and is unchanged  Current Meds   1  Aspirin 81 MG TABS; TAKE 1 TABLET DAILY; Therapy: (Recorded:24Sep2013) to Recorded   2  HydroCHLOROthiazide 12 5 MG Oral Tablet; TAKE 1 TABLET DAILY; Therapy: (Recorded:25Oct2017) to Recorded   3  Latanoprost 0 005 % Ophthalmic Solution; Therapy: (Recorded:48Ngx8785) to Recorded   4  Levothyroxine Sodium TABS;  Therapy: (Recorded:25Oct2017) to Recorded   5  Lisinopril 40 MG Oral Tablet; Take 1 tablet daily; Therapy: 03Cft9627 to (Evaluate:69Sor8513)  Requested for: 35IYH4147; Last Rx:96Gcu6593 Ordered   6  MetFORMIN HCl TABS; Therapy: (QYONTGZV:36HBX0507) to Recorded   7  Metoprolol Tartrate 50 MG Oral Tablet; take one tablet by mouth twice daily; Therapy: 29Vlv2945 to (Evaluate:44Onj0612)  Requested for: 43EOP0508; Last Rx:34Wtm5046 Ordered   8  Multi-Day Oral Tablet; Therapy: (Recorded:35Bpw1344) to Recorded  Medication List Reviewed: The medication list was reviewed and updated today  Allergies  Medication    1  No Known Drug Allergies    Vitals  Vital Signs    Recorded: 20JAM2461 10:03AM   Heart Rate 66, L Radial   Systolic 181, LUE, Sitting   Diastolic 70, LUE, Sitting   Height 5 ft 6 in   Weight 185 lb 6 oz   BMI Calculated 29 92   BSA Calculated 1 94   O2 Saturation 97       Physical Exam   Constitutional  General appearance: No acute distress, well appearing and well nourished  Eyes  Conjunctiva and Sclera examination: Conjunctiva pink, sclera anicteric  Ears, Nose, Mouth, and Throat - External inspection of ears and nose: Normal without deformities or discharge  Neck  Neck and thyroid: Normal, supple, trachea midline, no thyromegaly  Pulmonary  Respiratory effort: No increased work of breathing or signs of respiratory distress  Cardiovascular  Auscultation of heart: Normal rate and rhythm, normal S1 and S2, no murmurs  Carotid pulses: Normal, 2+ bilaterally  Examination of extremities for edema and/or varicosities: Normal    Abdomen  Abdomen: Non-tender and no distention  Musculoskeletal Gait and station: Normal gait  Skin - Skin and subcutaneous tissue: Normal without rashes or lesions  Skin is warm and well perfused, normal turgor     Neurologic - Speech: Normal    Psychiatric - Orientation to person, place, and time: Normal -- Mood and affect: Normal       Signatures   Electronically signed by : ROSALINO Watson ; Dec 6 2017 10:20AM EST                       (Author)

## 2018-01-13 VITALS
OXYGEN SATURATION: 98 % | SYSTOLIC BLOOD PRESSURE: 102 MMHG | BODY MASS INDEX: 29.3 KG/M2 | HEIGHT: 66 IN | WEIGHT: 182.31 LBS | DIASTOLIC BLOOD PRESSURE: 74 MMHG | HEART RATE: 62 BPM

## 2018-01-13 VITALS
BODY MASS INDEX: 29.1 KG/M2 | WEIGHT: 181.06 LBS | SYSTOLIC BLOOD PRESSURE: 140 MMHG | OXYGEN SATURATION: 98 % | DIASTOLIC BLOOD PRESSURE: 78 MMHG | HEIGHT: 66 IN | HEART RATE: 60 BPM

## 2018-01-13 VITALS
WEIGHT: 184.06 LBS | DIASTOLIC BLOOD PRESSURE: 72 MMHG | HEIGHT: 66 IN | SYSTOLIC BLOOD PRESSURE: 110 MMHG | HEART RATE: 72 BPM | BODY MASS INDEX: 29.58 KG/M2

## 2018-01-13 NOTE — CONSULTS
I had the pleasure of evaluating your patient, KINGA NANCE  My full evaluation follows:      Chief Complaint  Pt here for a 6 month f/u  Pt complains of body pains  Patient is here today for follow up of chronic conditions described in HPI  History of Present Illness  Mr Kenji Mccall is a 62year old man with essential hypertriglyceridemia and hypertension who presents for follow up  Has pinching sensation in left chest when lifting heavy objects  Lasts for a second at a time  Also has weakness throughout body  Review of Systems      Cardiac: chest pain  Skin: No complaints of nonhealing sores or skin rash  Genitourinary: No complaints of recurrent urinary tract infections, frequent urination at night, difficult urination, blood in urine, kidney stones, loss of bladder control, no kidney or prostate problems, no erectile dysfunction  Psychological: No complaints of feeling depressed, anxiety, panic attacks, or difficulty concentrating  General: trouble sleeping   Itching  Respiratory: No complaints of shortness of breath, cough with sputum, or wheezing  HEENT: No complaints of serious problems, hearing problems, nose problems, throat problems, or snoring  Gastrointestinal: No complaints of liver problems, nausea, vomiting, heartburn, constipation, bloody stools, diarrhea, problems swallowing, adbominal pain, or rectal bleeding  Hematologic: No complaints of bleeding disorders, anemia, blood clots, or excessive brusing  Neurological: No complaints of numbness, tingling, dizziness, weakness, seizures, headaches, syncope or fainting, AM fatigue, daytime sleepiness, no witnessed apnea episodes  Musculoskeletal: No complaints of arthritis, back pain, or painfull swelling  Active Problems    1  Abnormal fasting glucose (790 29) (R73 01)   2  Erectile dysfunction of non-organic origin (302 72) (F52 21)   3  Esophageal reflux (530 81) (K21 9)   4   Essential hypertriglyceridemia (272 1) (E78 1)   5  Hyperlipidemia (272 4) (E78 5)   6  Hypertension (401 9) (I10)    Past Medical History    The active problems and past medical history were reviewed and updated today  Surgical History    The surgical history was reviewed and updated today  Family History    · Family history of Diabetes mellitus due to underlying condition with microalbuminuria,  without long-term current use of insulin   · Family history of diabetes mellitus (V18 0) (Z83 3)    · Family history of Diabetes Mellitus (V18 0)    The family history was reviewed and updated today  Social History    · Being A Social Drinker   · Current Every Day Smoker (305 1)   · Current Smoker (305 1)  The social history was reviewed and updated today  The social history was reviewed and is unchanged  Current Meds   1  Aspirin 81 MG TABS; TAKE 1 TABLET DAILY; Therapy: (Recorded:74Jwo7156) to Recorded   2  HydroCHLOROthiazide 12 5 MG Oral Tablet; TAKE 1 TABLET DAILY; Therapy: (Recorded:89Uyc2507) to Recorded   3  Latanoprost 0 005 % Ophthalmic Solution; Therapy: (Recorded:98Fxi3981) to Recorded   4  Levothyroxine Sodium TABS; Therapy: (IDMDFOMK:41CUF3634) to Recorded   5  Lipitor TABS; Therapy: (JOCAMKPE:52EUJ8609) to Recorded   6  Lisinopril 40 MG Oral Tablet; Take 1 tablet daily; Therapy: 29Ydw7957 to (Evaluate:63Fed9768)  Requested for: 87DZH6884; Last   Rx:30Htr4432 Ordered   7  MetFORMIN HCl TABS; Therapy: (DHBEHCQB:62FAR8097) to Recorded   8  Metoprolol Tartrate 50 MG Oral Tablet; take one tablet by mouth twice daily; Therapy: 92Qvv6482 to (Evaluate:13Hqt0199)  Requested for: 65CMQ1754; Last   Rx:20Bsk0452 Ordered   9  Multi-Day Oral Tablet; Therapy: (Recorded:83Ecd1772) to Recorded    The medication list was reviewed and updated today  Allergies    1   No Known Drug Allergies    Vitals   Recorded: 27YOQ9995 09:34AM   Heart Rate 62   Systolic 212, RUE, Sitting   Diastolic 74, RUE, Sitting   Height 5 ft 6 in Weight 182 lb 5 oz   BMI Calculated 29 43   BSA Calculated 1 92   O2 Saturation 98     Physical Exam    Constitutional   General appearance: No acute distress, well appearing and well nourished  Eyes   Conjunctiva and Sclera examination: Conjunctiva pink, sclera anicteric  Ears, Nose, Mouth, and Throat - External inspection of ears and nose: Normal without deformities or discharge  Neck   Neck and thyroid: Normal, supple, trachea midline, no thyromegaly  Pulmonary   Respiratory effort: No increased work of breathing or signs of respiratory distress  Cardiovascular   Auscultation of heart: Normal rate and rhythm, normal S1 and S2, no murmurs  Carotid pulses: Normal, 2+ bilaterally  Examination of extremities for edema and/or varicosities: Normal     Abdomen   Abdomen: Non-tender and no distention  Musculoskeletal Gait and station: Normal gait  Skin - Skin and subcutaneous tissue: Normal without rashes or lesions  Skin is warm and well perfused, normal turgor  Neurologic - Speech: Normal     Psychiatric - Orientation to person, place, and time: Normal  Mood and affect: Normal       Assessment    1  Essential hypertriglyceridemia (272 1) (E78 1)   2  Hypertension (401 9) (I10)   3  Chest pain, atypical (786 59) (R07 89)    Plan  Chest pain, atypical    · Follow-up visit in 1 month Evaluation and Treatment  Follow-up  Status: Complete  Done:  25RLX9180   Ordered; For: Chest pain, atypical; Ordered By: Kathryn Reyes Performed:  Due: 05GZN4518; Last Updated By: Kirsten Mclain; 10/25/2017 9:54:00 AM  Unlinked    · Lipitor TABS (Atorvastatin Calcium)   Dispense: 0 Days ; #: Sufficient TABS; Refill: 0; COTY = N; Record; Last Updated By: Kathryn Reyes; 10/25/2017 9:52:43 AM    Discussion/Summary    62year old man with essential hypertriglyceridemia and hypertension who presents for follow up  Has pinching sensation in left chest when lifting heavy objects  Lasts for a second at a time   Also has weakness throughout body  Impression:  1  Essential hypertriglyceridemia - likely symptoms due to atorvastatin or fenofibrate  2  Hypertension - good control  3  Pinching sensation in chest - likely neurologic/due to nerve impingement  Recommendations:  1  Stop atorvastatin and fenofibrate  2  Continue remainder of medications  3  Follow up in one month  Thank you very much for allowing me to participate in the care of this patient  If you have any questions, please do not hesitate to contact me        Signatures   Electronically signed by : ROSALINO Centeno ; Oct 25 2017  9:54AM EST                       (Author)

## 2018-01-23 VITALS
BODY MASS INDEX: 29.79 KG/M2 | HEIGHT: 66 IN | SYSTOLIC BLOOD PRESSURE: 120 MMHG | OXYGEN SATURATION: 97 % | HEART RATE: 66 BPM | WEIGHT: 185.38 LBS | DIASTOLIC BLOOD PRESSURE: 70 MMHG

## 2018-01-23 NOTE — CONSULTS
I had the pleasure of evaluating your patient, KINGAJONATHAN CORRIGANO  My full evaluation follows:      Chief Complaint  patient are here for follow up, patient staying he feel good  Patient is here today for follow up of chronic conditions described in HPI  History of Present Illness  Mr Hali Escalera is a 62year old man with essential hypertriglyceridemia and hypertension who presents for follow up  Feels much better since stopping atorvastatin or fenofibrate  No further weakness or pinching sensations  Review of Systems      Cardiac: chest pain, but No complaints of chest pain, no palpitations, no fainiting  Skin: No complaints of nonhealing sores or skin rash  Genitourinary: No complaints of recurrent urinary tract infections, frequent urination at night, difficult urination, blood in urine, kidney stones, loss of bladder control, no kidney or prostate problems, no erectile dysfunction  Psychological: No complaints of feeling depressed, anxiety, panic attacks, or difficulty concentrating  General: trouble sleeping, but No complaints of trouble sleeping, lack of energy, fatigue, appetite changes, weight changes, fever, frequent infections, or night sweats  Itching  Respiratory: No complaints of shortness of breath, cough with sputum, or wheezing  HEENT: No complaints of serious problems, hearing problems, nose problems, throat problems, or snoring  Gastrointestinal: No complaints of liver problems, nausea, vomiting, heartburn, constipation, bloody stools, diarrhea, problems swallowing, adbominal pain, or rectal bleeding  Hematologic: No complaints of bleeding disorders, anemia, blood clots, or excessive brusing  Neurological: No complaints of numbness, tingling, dizziness, weakness, seizures, headaches, syncope or fainting, AM fatigue, daytime sleepiness, no witnessed apnea episodes  Musculoskeletal: No complaints of arthritis, back pain, or painfull swelling  Active Problems    1   Abnormal fasting glucose (790 29) (R73 01)   2  Chest pain, atypical (786 59) (R07 89)   3  Erectile dysfunction of non-organic origin (302 72) (F52 21)   4  Esophageal reflux (530 81) (K21 9)   5  Essential hypertriglyceridemia (272 1) (E78 1)   6  Hyperlipidemia (272 4) (E78 5)   7  Hypertension (401 9) (I10)    Past Medical History    The active problems and past medical history were reviewed and updated today  Surgical History    The surgical history was reviewed and updated today  Family History    · Family history of Diabetes mellitus due to underlying condition with microalbuminuria,  without long-term current use of insulin   · Family history of diabetes mellitus (V18 0) (Z83 3)    · Family history of Diabetes Mellitus (V18 0)    The family history was reviewed and updated today  Social History    · Being A Social Drinker   · Current Every Day Smoker (305 1)   · Current Smoker (305 1)  The social history was reviewed and updated today  The social history was reviewed and is unchanged  Current Meds   1  Aspirin 81 MG TABS; TAKE 1 TABLET DAILY; Therapy: (Recorded:19Cyc2422) to Recorded   2  HydroCHLOROthiazide 12 5 MG Oral Tablet; TAKE 1 TABLET DAILY; Therapy: (Recorded:36Tdh3753) to Recorded   3  Latanoprost 0 005 % Ophthalmic Solution; Therapy: (Recorded:65Iet2250) to Recorded   4  Levothyroxine Sodium TABS; Therapy: (UUNILLUM:59QXQ8571) to Recorded   5  Lisinopril 40 MG Oral Tablet; Take 1 tablet daily; Therapy: 71Xvg4557 to (Evaluate:67Bis5749)  Requested for: 80VNJ2184; Last   Rx:45Nod9816 Ordered   6  MetFORMIN HCl TABS; Therapy: (RUTBEAIV:29OPU5935) to Recorded   7  Metoprolol Tartrate 50 MG Oral Tablet; take one tablet by mouth twice daily; Therapy: 18Lqs5812 to (Evaluate:77Sxu6618)  Requested for: 06HEA2966; Last   Rx:76Hiv0451 Ordered   8  Multi-Day Oral Tablet; Therapy: (Recorded:29Geq7366) to Recorded    The medication list was reviewed and updated today  Allergies    1  No Known Drug Allergies    Vitals   Recorded: 55VOX0958 10:03AM   Heart Rate 66, L Radial   Systolic 649, LUE, Sitting   Diastolic 70, LUE, Sitting   Height 5 ft 6 in   Weight 185 lb 6 oz   BMI Calculated 29 92   BSA Calculated 1 94   O2 Saturation 97     Physical Exam    Constitutional   General appearance: No acute distress, well appearing and well nourished  Eyes   Conjunctiva and Sclera examination: Conjunctiva pink, sclera anicteric  Ears, Nose, Mouth, and Throat - External inspection of ears and nose: Normal without deformities or discharge  Neck   Neck and thyroid: Normal, supple, trachea midline, no thyromegaly  Pulmonary   Respiratory effort: No increased work of breathing or signs of respiratory distress  Cardiovascular   Auscultation of heart: Normal rate and rhythm, normal S1 and S2, no murmurs  Carotid pulses: Normal, 2+ bilaterally  Examination of extremities for edema and/or varicosities: Normal     Abdomen   Abdomen: Non-tender and no distention  Musculoskeletal Gait and station: Normal gait  Skin - Skin and subcutaneous tissue: Normal without rashes or lesions  Skin is warm and well perfused, normal turgor  Neurologic - Speech: Normal     Psychiatric - Orientation to person, place, and time: Normal  Mood and affect: Normal       Assessment    1  Hyperlipidemia (272 4) (E78 5)    Plan  Abnormal fasting glucose, Chest pain, atypical, Health Maintenance, Erectile dysfunction  of non-organic origin, Esophageal reflux, Essential hypertriglyceridemia, Hyperlipidemia    · Vascepa 1 GM Oral Capsule; TAKE 4 CAPSULE Daily   Rx By: Kathryn Reyes; Dispense: 30 Days ; #:120 Capsule;  Refill: 5; For: Abnormal fasting glucose, Chest pain, atypical, Health Maintenance, Erectile dysfunction of non-organic origin, Esophageal reflux, Essential hypertriglyceridemia, Hyperlipidemia; COTY = N; Verified Transmission to [facility]; Last Updated By: System, Myesha; 12/6/2017 10:19:25 AM   · (1) COMPREHENSIVE METABOLIC PANEL; Status:Active; Requested for:68Lxd9853;    Perform:[facility]; OEA:79DHI1551; Ordered; For:Abnormal fasting glucose, Chest pain, atypical, Health Maintenance, Erectile dysfunction of non-organic origin, Esophageal reflux, Essential hypertriglyceridemia, Hyperlipidemia; Ordered By:Chino Spann;   · (1) LIPID PANEL FASTING W DIRECT LDL REFLEX; Status:Active; Requested  for:40Akz3521;    Perform:[facility]; RUO:61NMR1902; Ordered; For:Abnormal fasting glucose, Chest pain, atypical, Health Maintenance, Erectile dysfunction of non-organic origin, Esophageal reflux, Essential hypertriglyceridemia, Hyperlipidemia; Ordered By:Chino Spann;   · Follow-up visit in 6 months Evaluation and Treatment  Follow-up  Status: Complete   Done: 24REK1071   Ordered; For: Abnormal fasting glucose, Chest pain, atypical, Health Maintenance, Erectile dysfunction of non-organic origin, Esophageal reflux, Essential hypertriglyceridemia, Hyperlipidemia; Ordered By: Corinne Fountain Performed:  Due: 86UTE1608; Last Updated By: Gabi Aguirre; 12/6/2017 10:19:36 AM    Discussion/Summary    62year old man with essential hypertriglyceridemia and hypertension who presents for follow up  Feels much better since stopping atorvastatin or fenofibrate  No further weakness or pinching sensations  Impression:  1  Essential hypertriglyceridemia - off medications  2  Hypertension - good control  3  Pinching sensation in chest - improved  Recommendations:  1  Start Vascepa  2  Continue remainder of medications  3  Check fasting lipid panel in March 2018  4  Follow up in 6 months  Thank you very much for allowing me to participate in the care of this patient  If you have any questions, please do not hesitate to contact me        Signatures   Electronically signed by : ROSALINO Arevalo ; Dec  6 2017 10:20AM EST (Author)

## 2018-03-21 ENCOUNTER — OFFICE VISIT (OUTPATIENT)
Dept: SLEEP CENTER | Facility: CLINIC | Age: 58
End: 2018-03-21
Payer: COMMERCIAL

## 2018-03-21 VITALS
SYSTOLIC BLOOD PRESSURE: 140 MMHG | WEIGHT: 186 LBS | HEART RATE: 60 BPM | DIASTOLIC BLOOD PRESSURE: 90 MMHG | BODY MASS INDEX: 29.89 KG/M2 | HEIGHT: 66 IN

## 2018-03-21 DIAGNOSIS — G47.33 OSA (OBSTRUCTIVE SLEEP APNEA): ICD-10-CM

## 2018-03-21 PROCEDURE — 99214 OFFICE O/P EST MOD 30 MIN: CPT | Performed by: INTERNAL MEDICINE

## 2018-03-21 RX ORDER — ICOSAPENT ETHYL 1000 MG/1
4 CAPSULE ORAL DAILY
COMMUNITY
Start: 2017-12-06 | End: 2018-10-16 | Stop reason: SDUPTHER

## 2018-03-21 RX ORDER — LEVOTHYROXINE SODIUM 0.03 MG/1
TABLET ORAL
COMMUNITY
Start: 2018-03-17

## 2018-03-21 NOTE — PROGRESS NOTES
Progress Note - Sleep Center   Kingsley Jacob IZV:[id number] MRN: [id number]      Reason for Visit:  62 y  o male here for annual follow-up    Assessment:  Doing well on current therapy of BiPAP 13/9 cm for severe MALA (AHI = 45)  The patient's current device seems to be malfunctioning  I recommended that he bring it in to be checked for problems  He may require a new device  His current device is 8years old  Plan:  Dispense a new BiPAP device if insurance allows    Follow up: One year    History of Present Illness:  History of MALA on PAP therapy  Fully compliant and deriving benefit  Historical Information    Past Medical History:   Past Medical History:   Diagnosis Date    Cardiac disease     Diabetes mellitus ([facility] [address] )     Hyperlipidemia     Hypertension     Myocardial infarct     2006         Past Surgical History: History reviewed  No pertinent surgical history  Social History - see chart  History   Alcohol use: Not on file     History   Drug Use Not on file     History   Smoking Status    Not on file   Smokeless Tobacco    Not on file     Family History: History reviewed  No pertinent family history      Medications/Allergies:      Current Outpatient Prescriptions:     aspirin 81 MG tablet, Take 81 mg by mouth daily, Disp: , Rfl:     Icosapent Ethyl (VASCEPA) 1 g CAPS, Take 4 capsules by mouth daily, Disp: , Rfl:     latanoprost (XALATAN) 0 005 % ophthalmic solution, Apply 1 drop to eye daily at bedtime, Disp: , Rfl:     levothyroxine 25 mcg tablet, , Disp: , Rfl:     lisinopril (ZESTRIL) 40 mg tablet, Take 40 mg by mouth daily, Disp: , Rfl:     metFORMIN (GLUCOPHAGE) 500 mg tablet, Take 500 mg by mouth daily with breakfast, Disp: , Rfl:     metoprolol tartrate (LOPRESSOR) 50 mg tablet, Take 50 mg by mouth, Disp: , Rfl:     Multiple Vitamins-Minerals (MULTIVITAMIN ADULT PO), Take 1 tablet by mouth daily, Disp: , Rfl:     fenofibrate (TRICOR) 145 mg tablet, Take 145 mg by mouth daily at bedtime, Disp: , Rfl:     pravastatin (PRAVACHOL) 20 mg tablet, Take 20 mg by mouth daily, Disp: , Rfl:       Review of Systems      Genitourinary none   Cardiology none   Gastrointestinal none   Neurology headaches   Constitutional none   Integumentary none   Psychiatry none   Musculoskeletal back pain   Pulmonary none   ENT nasal or sinus congestion   Endocrine none   Hematological none           Objective      Vital Signs:   Vitals:    03/21/18 1000   BP: 140/90   Pulse: 60     [place] Sleepiness Scale: Total score: 0        Physical Exam:    General: Alert, appropriate, cooperative, overweight    Head: NC/AT    Skin: Warm, dry    Neuro: No motor abnormalities, cranial nerves appear intact    Extremity: No clubbing, cyanosis    PAP setting:  BiPAP 13/9 cm  DME Provider: [employer]    Counseling / Coordination of Care  Total clinic time spent today 15 minutes  Greater than 50% of total time was spent with the patient and / or family counseling and / or coordination of care  A description of the counseling / coordination of care: Discussed equipment and response to treatment  ROSALINO Santillan    Board Certified Sleep Specialist

## 2018-03-23 ENCOUNTER — APPOINTMENT (OUTPATIENT)
Dept: LAB | Facility: HOSPITAL | Age: 58
End: 2018-03-23
Attending: INTERNAL MEDICINE
Payer: COMMERCIAL

## 2018-03-23 ENCOUNTER — TRANSCRIBE ORDERS (OUTPATIENT)
Dept: LAB | Facility: HOSPITAL | Age: 58
End: 2018-03-23

## 2018-03-23 DIAGNOSIS — E03.9 MYXEDEMA HEART DISEASE: ICD-10-CM

## 2018-03-23 DIAGNOSIS — I51.9 MYXEDEMA HEART DISEASE: ICD-10-CM

## 2018-03-23 DIAGNOSIS — R07.89 OTHER CHEST PAIN: ICD-10-CM

## 2018-03-23 DIAGNOSIS — E78.5 HYPERLIPIDEMIA: ICD-10-CM

## 2018-03-23 DIAGNOSIS — E88.81 DYSMETABOLIC SYNDROME X: Primary | ICD-10-CM

## 2018-03-23 DIAGNOSIS — E78.1 PURE HYPERGLYCERIDEMIA: ICD-10-CM

## 2018-03-23 DIAGNOSIS — F52.21 MALE ERECTILE DISORDER (CODE): ICD-10-CM

## 2018-03-23 DIAGNOSIS — Z00.00 ENCOUNTER FOR GENERAL ADULT MEDICAL EXAMINATION WITHOUT ABNORMAL FINDINGS: ICD-10-CM

## 2018-03-23 DIAGNOSIS — R73.01 IMPAIRED FASTING GLUCOSE: ICD-10-CM

## 2018-03-23 DIAGNOSIS — E88.81 DYSMETABOLIC SYNDROME X: ICD-10-CM

## 2018-03-23 DIAGNOSIS — K21.9 GASTRO-ESOPHAGEAL REFLUX DISEASE WITHOUT ESOPHAGITIS: ICD-10-CM

## 2018-03-23 LAB
ALBUMIN SERPL BCP-MCNC: 4 G/DL (ref 3.5–5)
ALP SERPL-CCNC: 47 U/L (ref 46–116)
ALT SERPL W P-5'-P-CCNC: 51 U/L (ref 12–78)
ANION GAP SERPL CALCULATED.3IONS-SCNC: 7 MMOL/L (ref 4–13)
AST SERPL W P-5'-P-CCNC: 28 U/L (ref 5–45)
BILIRUB SERPL-MCNC: 0.73 MG/DL (ref 0.2–1)
BUN SERPL-MCNC: 17 MG/DL (ref 5–25)
CALCIUM SERPL-MCNC: 9 MG/DL (ref 8.3–10.1)
CHLORIDE SERPL-SCNC: 101 MMOL/L (ref 100–108)
CHOLEST SERPL-MCNC: 205 MG/DL (ref 50–200)
CO2 SERPL-SCNC: 28 MMOL/L (ref 21–32)
CREAT SERPL-MCNC: 0.87 MG/DL (ref 0.6–1.3)
EST. AVERAGE GLUCOSE BLD GHB EST-MCNC: 163 MG/DL
GFR SERPL CREATININE-BSD FRML MDRD: 96 ML/MIN/1.73SQ M
GLUCOSE P FAST SERPL-MCNC: 190 MG/DL (ref 65–99)
HBA1C MFR BLD: 7.3 % (ref 4.2–6.3)
HDLC SERPL-MCNC: 29 MG/DL (ref 40–60)
LDLC SERPL DIRECT ASSAY-MCNC: 82 MG/DL (ref 0–100)
POTASSIUM SERPL-SCNC: 3.3 MMOL/L (ref 3.5–5.3)
PROT SERPL-MCNC: 7.7 G/DL (ref 6.4–8.2)
SODIUM SERPL-SCNC: 136 MMOL/L (ref 136–145)
TRIGL SERPL-MCNC: 565 MG/DL
TSH SERPL DL<=0.05 MIU/L-ACNC: 3.06 UIU/ML (ref 0.36–3.74)

## 2018-03-23 PROCEDURE — 84443 ASSAY THYROID STIM HORMONE: CPT

## 2018-03-23 PROCEDURE — 83721 ASSAY OF BLOOD LIPOPROTEIN: CPT

## 2018-03-23 PROCEDURE — 80061 LIPID PANEL: CPT

## 2018-03-23 PROCEDURE — 36415 COLL VENOUS BLD VENIPUNCTURE: CPT

## 2018-03-23 PROCEDURE — 83036 HEMOGLOBIN GLYCOSYLATED A1C: CPT

## 2018-03-23 PROCEDURE — 80053 COMPREHEN METABOLIC PANEL: CPT

## 2018-03-26 ENCOUNTER — HOSPITAL ENCOUNTER (OUTPATIENT)
Dept: SLEEP CENTER | Facility: CLINIC | Age: 58
Discharge: HOME | End: 2018-03-26

## 2018-05-04 ENCOUNTER — TRANSCRIBE ORDERS (OUTPATIENT)
Dept: LAB | Facility: HOSPITAL | Age: 58
End: 2018-05-04

## 2018-05-04 ENCOUNTER — APPOINTMENT (OUTPATIENT)
Dept: LAB | Facility: HOSPITAL | Age: 58
End: 2018-05-04
Attending: FAMILY MEDICINE
Payer: COMMERCIAL

## 2018-05-04 DIAGNOSIS — Z79.4 TYPE 2 DIABETES MELLITUS WITHOUT COMPLICATION, WITH LONG-TERM CURRENT USE OF INSULIN (HCC): ICD-10-CM

## 2018-05-04 DIAGNOSIS — E11.9 TYPE 2 DIABETES MELLITUS WITHOUT COMPLICATION, WITH LONG-TERM CURRENT USE OF INSULIN (HCC): Primary | ICD-10-CM

## 2018-05-04 DIAGNOSIS — Z79.4 TYPE 2 DIABETES MELLITUS WITHOUT COMPLICATION, WITH LONG-TERM CURRENT USE OF INSULIN (HCC): Primary | ICD-10-CM

## 2018-05-04 DIAGNOSIS — E11.9 TYPE 2 DIABETES MELLITUS WITHOUT COMPLICATION, WITH LONG-TERM CURRENT USE OF INSULIN (HCC): ICD-10-CM

## 2018-05-04 LAB
ANION GAP SERPL CALCULATED.3IONS-SCNC: 7 MMOL/L (ref 4–13)
BUN SERPL-MCNC: 20 MG/DL (ref 5–25)
CA-I BLD-SCNC: 1.18 MMOL/L (ref 1.12–1.32)
CALCIUM SERPL-MCNC: 8.9 MG/DL (ref 8.3–10.1)
CHLORIDE SERPL-SCNC: 104 MMOL/L (ref 100–108)
CO2 SERPL-SCNC: 26 MMOL/L (ref 21–32)
CREAT SERPL-MCNC: 0.96 MG/DL (ref 0.6–1.3)
CREAT UR-MCNC: 24.8 MG/DL
EST. AVERAGE GLUCOSE BLD GHB EST-MCNC: 151 MG/DL
GFR SERPL CREATININE-BSD FRML MDRD: 87 ML/MIN/1.73SQ M
GLUCOSE P FAST SERPL-MCNC: 169 MG/DL (ref 65–99)
HBA1C MFR BLD: 6.9 % (ref 4.2–6.3)
MICROALBUMIN UR-MCNC: <5 MG/L (ref 0–20)
MICROALBUMIN/CREAT 24H UR: <20 MG/G CREATININE (ref 0–30)
POTASSIUM SERPL-SCNC: 3.7 MMOL/L (ref 3.5–5.3)
SODIUM SERPL-SCNC: 137 MMOL/L (ref 136–145)

## 2018-05-04 PROCEDURE — 82330 ASSAY OF CALCIUM: CPT

## 2018-05-04 PROCEDURE — 83036 HEMOGLOBIN GLYCOSYLATED A1C: CPT

## 2018-05-04 PROCEDURE — 80048 BASIC METABOLIC PNL TOTAL CA: CPT

## 2018-05-04 PROCEDURE — 82570 ASSAY OF URINE CREATININE: CPT

## 2018-05-04 PROCEDURE — 82043 UR ALBUMIN QUANTITATIVE: CPT

## 2018-05-04 PROCEDURE — 36415 COLL VENOUS BLD VENIPUNCTURE: CPT

## 2018-06-05 ENCOUNTER — TELEPHONE (OUTPATIENT)
Dept: CARDIOLOGY CLINIC | Facility: CLINIC | Age: 58
End: 2018-06-05

## 2018-06-05 DIAGNOSIS — E78.5 HYPERLIPIDEMIA, UNSPECIFIED HYPERLIPIDEMIA TYPE: Primary | ICD-10-CM

## 2018-06-05 NOTE — TELEPHONE ENCOUNTER
Rossy Hernandez called, do you want him to have b/w prior to o/v 6/11? Had CMP & lipid panel in 3/2018  Please advise     c/b # [phone]

## 2018-06-05 NOTE — TELEPHONE ENCOUNTER
Orders in  Left message for Sage Arnett advising-- advised he must fast for b/w  Advised to c/b w/ questions

## 2018-06-08 ENCOUNTER — TRANSCRIBE ORDERS (OUTPATIENT)
Dept: LAB | Facility: HOSPITAL | Age: 58
End: 2018-06-08

## 2018-06-08 ENCOUNTER — APPOINTMENT (OUTPATIENT)
Dept: LAB | Facility: HOSPITAL | Age: 58
End: 2018-06-08
Attending: INTERNAL MEDICINE
Payer: COMMERCIAL

## 2018-06-08 DIAGNOSIS — L90.5 SCAR PAINFUL: Primary | ICD-10-CM

## 2018-06-08 DIAGNOSIS — E78.5 HYPERLIPIDEMIA, UNSPECIFIED HYPERLIPIDEMIA TYPE: ICD-10-CM

## 2018-06-08 DIAGNOSIS — R52 SCAR PAINFUL: Primary | ICD-10-CM

## 2018-06-08 DIAGNOSIS — N40.0 BENIGN PROSTATIC HYPERPLASIA WITHOUT LOWER URINARY TRACT SYMPTOMS: ICD-10-CM

## 2018-06-08 DIAGNOSIS — E08.00 DIABETES MELLITUS DUE TO UNDERLYING CONDITION WITH HYPEROSMOLARITY WITHOUT COMA, WITHOUT LONG-TERM CURRENT USE OF INSULIN (HCC): ICD-10-CM

## 2018-06-08 LAB
ALBUMIN SERPL BCP-MCNC: 4.2 G/DL (ref 3.5–5)
ALP SERPL-CCNC: 28 U/L (ref 46–116)
ALT SERPL W P-5'-P-CCNC: 34 U/L (ref 12–78)
ANION GAP SERPL CALCULATED.3IONS-SCNC: 7 MMOL/L (ref 4–13)
AST SERPL W P-5'-P-CCNC: 17 U/L (ref 5–45)
BILIRUB SERPL-MCNC: 0.7 MG/DL (ref 0.2–1)
BUN SERPL-MCNC: 19 MG/DL (ref 5–25)
CALCIUM SERPL-MCNC: 8.9 MG/DL (ref 8.3–10.1)
CHLORIDE SERPL-SCNC: 105 MMOL/L (ref 100–108)
CHOLEST SERPL-MCNC: 152 MG/DL (ref 50–200)
CO2 SERPL-SCNC: 28 MMOL/L (ref 21–32)
CREAT SERPL-MCNC: 1.01 MG/DL (ref 0.6–1.3)
GFR SERPL CREATININE-BSD FRML MDRD: 82 ML/MIN/1.73SQ M
GLUCOSE P FAST SERPL-MCNC: 106 MG/DL (ref 65–99)
HDLC SERPL-MCNC: 30 MG/DL (ref 40–60)
LDLC SERPL CALC-MCNC: 87 MG/DL (ref 0–100)
POTASSIUM SERPL-SCNC: 3.4 MMOL/L (ref 3.5–5.3)
PROT SERPL-MCNC: 7.4 G/DL (ref 6.4–8.2)
SODIUM SERPL-SCNC: 140 MMOL/L (ref 136–145)
TRIGL SERPL-MCNC: 176 MG/DL

## 2018-06-08 PROCEDURE — 80061 LIPID PANEL: CPT

## 2018-06-08 PROCEDURE — 36415 COLL VENOUS BLD VENIPUNCTURE: CPT

## 2018-06-08 PROCEDURE — 80053 COMPREHEN METABOLIC PANEL: CPT

## 2018-06-11 ENCOUNTER — OFFICE VISIT (OUTPATIENT)
Dept: CARDIOLOGY CLINIC | Facility: CLINIC | Age: 58
End: 2018-06-11
Payer: COMMERCIAL

## 2018-06-11 VITALS
DIASTOLIC BLOOD PRESSURE: 70 MMHG | BODY MASS INDEX: 29.47 KG/M2 | WEIGHT: 183.4 LBS | SYSTOLIC BLOOD PRESSURE: 132 MMHG | HEIGHT: 66 IN | OXYGEN SATURATION: 88 % | HEART RATE: 62 BPM

## 2018-06-11 DIAGNOSIS — I10 ESSENTIAL HYPERTENSION: Primary | ICD-10-CM

## 2018-06-11 DIAGNOSIS — E78.5 DYSLIPIDEMIA: ICD-10-CM

## 2018-06-11 PROCEDURE — 99214 OFFICE O/P EST MOD 30 MIN: CPT | Performed by: INTERNAL MEDICINE

## 2018-06-11 RX ORDER — HYDROCHLOROTHIAZIDE 12.5 MG/1
12.5 CAPSULE, GELATIN COATED ORAL DAILY
COMMUNITY

## 2018-06-11 RX ORDER — GLIPIZIDE 5 MG/1
5 TABLET ORAL
COMMUNITY
End: 2019-03-20 | Stop reason: ALTCHOICE

## 2018-06-11 NOTE — PROGRESS NOTES
Cardiology   Rudy Bosworth 62 y o  male MRN: [id number]        Impression:  1  Essential hypertriglyceridemia - improved by Vascepa, but expensive  2  Hypertension - good control  Recommendations:  1  Continue current medications  2  Follow up in 6 months  HPI: Rudy Bosworth is a 62y o  year old male with essential hypertriglyceridemia and hypertension who presents for follow up  Major complaint is arthritis  No chest pain, shortness of breath, or palpitations  Review of Systems   Constitutional: Negative  HENT: Negative  Eyes: Negative  Respiratory: Negative for chest tightness and shortness of breath  Cardiovascular: Negative for chest pain, palpitations and leg swelling  Gastrointestinal: Negative  Endocrine: Negative  Genitourinary: Negative  Musculoskeletal: Positive for arthralgias  Skin: Negative  Allergic/Immunologic: Negative  Neurological: Negative  Hematological: Negative  Psychiatric/Behavioral: Negative  All other systems reviewed and are negative  Past Medical History:   Diagnosis Date    Cardiac disease     Diabetes mellitus ([facility] )     Hyperlipidemia     Hypertension     Myocardial infarct ([facility] )     2006     No past surgical history on file  History   Alcohol Use    Yes     Comment: occassionally     History   Drug Use No     History   Smoking Status    Current Every Day Smoker    Packs/day: 0 25   Smokeless Tobacco    Never Used     No family history on file      Allergies:  No Known Allergies    Medications:     Current Outpatient Prescriptions:     aspirin 81 MG tablet, Take 81 mg by mouth daily, Disp: , Rfl:     fenofibrate (TRICOR) 145 mg tablet, Take 145 mg by mouth daily at bedtime, Disp: , Rfl:     glipiZIDE (GLUCOTROL) 5 mg tablet, Take 5 mg by mouth 2 (two) times a day before meals, Disp: , Rfl:     hydrochlorothiazide (MICROZIDE) 12 5 mg capsule, Take 12 5 mg by mouth daily, Disp: , Rfl:     latanoprost (XALATAN) 0 005 % ophthalmic solution, Apply 1 drop to eye daily at bedtime, Disp: , Rfl:     levothyroxine 25 mcg tablet, , Disp: , Rfl:     lisinopril (ZESTRIL) 40 mg tablet, Take 40 mg by mouth daily, Disp: , Rfl:     metoprolol tartrate (LOPRESSOR) 50 mg tablet, Take 50 mg by mouth, Disp: , Rfl:     Multiple Vitamins-Minerals (MULTIVITAMIN ADULT PO), Take 1 tablet by mouth daily, Disp: , Rfl:     Icosapent Ethyl (VASCEPA) 1 g CAPS, Take 4 capsules by mouth daily, Disp: , Rfl:       Wt Readings from Last 3 Encounters:   06/11/18 83 2 kg (183 lb 6 4 oz)   03/21/18 84 4 kg (186 lb)   12/06/17 84 1 kg (185 lb 6 1 oz)     Temp Readings from Last 3 Encounters:   10/23/17 98 2 °F (36 8 °C)     BP Readings from Last 3 Encounters:   06/11/18 132/70   03/21/18 140/90   12/06/17 120/70     Pulse Readings from Last 3 Encounters:   06/11/18 62   03/21/18 60   12/06/17 66         Physical Exam   Constitutional: He is oriented to person, place, and time  He appears well-developed  HENT:   Head: Normocephalic  Eyes: EOM are normal    Neck: Normal range of motion  Cardiovascular: Normal rate, regular rhythm and normal heart sounds  Exam reveals no gallop and no friction rub  No murmur heard  Pulmonary/Chest: Effort normal and breath sounds normal  No respiratory distress  He has no wheezes  He has no rales  Abdominal: Soft  Musculoskeletal: Normal range of motion  Neurological: He is alert and oriented to person, place, and time  Skin: Skin is warm and dry  Psychiatric: He has a normal mood and affect           Laboratory Studies:  CMP:  Lab Results   Component Value Date     06/08/2018    K 3 4 (L) 06/08/2018     06/08/2018    CO2 28 06/08/2018    ANIONGAP 7 06/08/2018    BUN 19 06/08/2018    CREATININE 1 01 06/08/2018    GLUCOSE 151 (H) 10/23/2017    AST 17 06/08/2018    ALT 34 06/08/2018    BILITOT 0 70 06/08/2018    EGFR 82 06/08/2018       Lipid Profile:   Lab Results   Component Value Date    CHOL 152 06/08/2018     Lab Results   Component Value Date    HDL 30 (L) 06/08/2018     Lab Results   Component Value Date    LDLCALC 87 06/08/2018     Lab Results   Component Value Date    TRIG 176 (H) 06/08/2018

## 2018-06-20 ENCOUNTER — APPOINTMENT (OUTPATIENT)
Dept: LAB | Facility: HOSPITAL | Age: 58
End: 2018-06-20
Attending: FAMILY MEDICINE
Payer: COMMERCIAL

## 2018-06-20 DIAGNOSIS — R52 SCAR PAINFUL: ICD-10-CM

## 2018-06-20 DIAGNOSIS — L90.5 SCAR PAINFUL: ICD-10-CM

## 2018-06-20 DIAGNOSIS — E08.00 DIABETES MELLITUS DUE TO UNDERLYING CONDITION WITH HYPEROSMOLARITY WITHOUT COMA, WITHOUT LONG-TERM CURRENT USE OF INSULIN (HCC): ICD-10-CM

## 2018-06-20 DIAGNOSIS — N40.0 BENIGN PROSTATIC HYPERPLASIA WITHOUT LOWER URINARY TRACT SYMPTOMS: ICD-10-CM

## 2018-06-20 LAB
BASOPHILS # BLD AUTO: 0.02 THOUSANDS/ΜL (ref 0–0.1)
BASOPHILS NFR BLD AUTO: 0 % (ref 0–1)
CHOLEST SERPL-MCNC: 198 MG/DL (ref 50–200)
CK SERPL-CCNC: 119 U/L (ref 39–308)
EOSINOPHIL # BLD AUTO: 0.17 THOUSAND/ΜL (ref 0–0.61)
EOSINOPHIL NFR BLD AUTO: 4 % (ref 0–6)
ERYTHROCYTE [DISTWIDTH] IN BLOOD BY AUTOMATED COUNT: 11.4 % (ref 11.6–15.1)
ERYTHROCYTE [SEDIMENTATION RATE] IN BLOOD: 11 MM/HOUR (ref 0–10)
EST. AVERAGE GLUCOSE BLD GHB EST-MCNC: 134 MG/DL
HBA1C MFR BLD: 6.3 % (ref 4.2–6.3)
HCT VFR BLD AUTO: 36.5 % (ref 36.5–49.3)
HDLC SERPL-MCNC: 27 MG/DL (ref 40–60)
HGB BLD-MCNC: 12.2 G/DL (ref 12–17)
IMM GRANULOCYTES # BLD AUTO: 0.03 THOUSAND/UL (ref 0–0.2)
IMM GRANULOCYTES NFR BLD AUTO: 1 % (ref 0–2)
LYMPHOCYTES # BLD AUTO: 1.81 THOUSANDS/ΜL (ref 0.6–4.47)
LYMPHOCYTES NFR BLD AUTO: 38 % (ref 14–44)
MCH RBC QN AUTO: 32.7 PG (ref 26.8–34.3)
MCHC RBC AUTO-ENTMCNC: 33.4 G/DL (ref 31.4–37.4)
MCV RBC AUTO: 98 FL (ref 82–98)
MONOCYTES # BLD AUTO: 0.39 THOUSAND/ΜL (ref 0.17–1.22)
MONOCYTES NFR BLD AUTO: 8 % (ref 4–12)
NEUTROPHILS # BLD AUTO: 2.36 THOUSANDS/ΜL (ref 1.85–7.62)
NEUTS SEG NFR BLD AUTO: 49 % (ref 43–75)
NONHDLC SERPL-MCNC: 171 MG/DL
NRBC BLD AUTO-RTO: 0 /100 WBCS
PLATELET # BLD AUTO: 211 THOUSANDS/UL (ref 149–390)
PMV BLD AUTO: 10.3 FL (ref 8.9–12.7)
PSA SERPL-MCNC: 0.5 NG/ML (ref 0–4)
RBC # BLD AUTO: 3.73 MILLION/UL (ref 3.88–5.62)
TRIGL SERPL-MCNC: 728 MG/DL
WBC # BLD AUTO: 4.78 THOUSAND/UL (ref 4.31–10.16)

## 2018-06-20 PROCEDURE — 85652 RBC SED RATE AUTOMATED: CPT

## 2018-06-20 PROCEDURE — 85025 COMPLETE CBC W/AUTO DIFF WBC: CPT

## 2018-06-20 PROCEDURE — 82550 ASSAY OF CK (CPK): CPT

## 2018-06-20 PROCEDURE — 86430 RHEUMATOID FACTOR TEST QUAL: CPT

## 2018-06-20 PROCEDURE — 83036 HEMOGLOBIN GLYCOSYLATED A1C: CPT

## 2018-06-20 PROCEDURE — G0103 PSA SCREENING: HCPCS

## 2018-06-20 PROCEDURE — 80061 LIPID PANEL: CPT

## 2018-06-20 PROCEDURE — 36415 COLL VENOUS BLD VENIPUNCTURE: CPT

## 2018-06-20 PROCEDURE — 86038 ANTINUCLEAR ANTIBODIES: CPT

## 2018-06-21 LAB — RHEUMATOID FACT SER QL LA: NEGATIVE

## 2018-06-25 ENCOUNTER — TRANSCRIBE ORDERS (OUTPATIENT)
Dept: ADMINISTRATIVE | Facility: HOSPITAL | Age: 58
End: 2018-06-25

## 2018-06-25 DIAGNOSIS — R10.811 RIGHT UPPER QUADRANT ABDOMINAL TENDERNESS, REBOUND TENDERNESS PRESENCE NOT SPECIFIED: Primary | ICD-10-CM

## 2018-06-25 LAB — RYE IGE QN: NEGATIVE

## 2018-07-02 ENCOUNTER — HOSPITAL ENCOUNTER (OUTPATIENT)
Dept: RADIOLOGY | Facility: HOSPITAL | Age: 58
Discharge: HOME | End: 2018-07-02
Attending: FAMILY MEDICINE
Payer: COMMERCIAL

## 2018-07-02 DIAGNOSIS — R10.811 RIGHT UPPER QUADRANT ABDOMINAL TENDERNESS, REBOUND TENDERNESS PRESENCE NOT SPECIFIED: ICD-10-CM

## 2018-07-02 PROCEDURE — 76700 US EXAM ABDOM COMPLETE: CPT

## 2018-07-11 DIAGNOSIS — I10 ESSENTIAL HYPERTENSION: Primary | ICD-10-CM

## 2018-07-12 RX ORDER — METOPROLOL TARTRATE 50 MG/1
TABLET, FILM COATED ORAL
Qty: 60 TABLET | Refills: 11 | Status: SHIPPED | OUTPATIENT
Start: 2018-07-12 | End: 2019-09-03 | Stop reason: SDUPTHER

## 2018-08-22 DIAGNOSIS — I48.91 ATRIAL FIBRILLATION, UNSPECIFIED TYPE (HCC): Primary | ICD-10-CM

## 2018-08-22 RX ORDER — FENOFIBRATE 145 MG/1
145 TABLET, COATED ORAL
Qty: 30 TABLET | Refills: 11 | Status: SHIPPED | OUTPATIENT
Start: 2018-08-22 | End: 2019-09-03 | Stop reason: SDUPTHER

## 2018-09-07 ENCOUNTER — HOSPITAL ENCOUNTER (EMERGENCY)
Facility: HOSPITAL | Age: 58
Discharge: HOME | End: 2018-09-07
Attending: EMERGENCY MEDICINE
Payer: COMMERCIAL

## 2018-09-07 VITALS
TEMPERATURE: 98.4 F | RESPIRATION RATE: 16 BRPM | WEIGHT: 182 LBS | OXYGEN SATURATION: 98 % | HEART RATE: 86 BPM | SYSTOLIC BLOOD PRESSURE: 130 MMHG | HEIGHT: 66 IN | BODY MASS INDEX: 29.25 KG/M2 | DIASTOLIC BLOOD PRESSURE: 67 MMHG

## 2018-09-07 DIAGNOSIS — K29.70 GASTRITIS: Primary | ICD-10-CM

## 2018-09-07 LAB
ALBUMIN SERPL BCP-MCNC: 4.4 G/DL (ref 3.5–5)
ALP SERPL-CCNC: 40 U/L (ref 46–116)
ALT SERPL W P-5'-P-CCNC: 35 U/L (ref 12–78)
ANION GAP SERPL CALCULATED.3IONS-SCNC: 7 MMOL/L (ref 4–13)
AST SERPL W P-5'-P-CCNC: 18 U/L (ref 5–45)
BASOPHILS # BLD AUTO: 0.03 THOUSANDS/ΜL (ref 0–0.1)
BASOPHILS NFR BLD AUTO: 0 % (ref 0–1)
BILIRUB SERPL-MCNC: 0.54 MG/DL (ref 0.2–1)
BILIRUB UR QL STRIP: NEGATIVE
BUN SERPL-MCNC: 28 MG/DL (ref 5–25)
CALCIUM SERPL-MCNC: 9.2 MG/DL (ref 8.3–10.1)
CHLORIDE SERPL-SCNC: 102 MMOL/L (ref 100–108)
CLARITY UR: CLEAR
CO2 SERPL-SCNC: 26 MMOL/L (ref 21–32)
COLOR UR: YELLOW
CREAT SERPL-MCNC: 1.39 MG/DL (ref 0.6–1.3)
EOSINOPHIL # BLD AUTO: 0.15 THOUSAND/ΜL (ref 0–0.61)
EOSINOPHIL NFR BLD AUTO: 2 % (ref 0–6)
ERYTHROCYTE [DISTWIDTH] IN BLOOD BY AUTOMATED COUNT: 11.2 % (ref 11.6–15.1)
GFR SERPL CREATININE-BSD FRML MDRD: 55 ML/MIN/1.73SQ M
GLUCOSE SERPL-MCNC: 194 MG/DL (ref 65–140)
GLUCOSE UR STRIP-MCNC: ABNORMAL MG/DL
HCT VFR BLD AUTO: 36.6 % (ref 36.5–49.3)
HGB BLD-MCNC: 12.3 G/DL (ref 12–17)
HGB UR QL STRIP.AUTO: NEGATIVE
HOLD SPECIMEN: NORMAL
IMM GRANULOCYTES # BLD AUTO: 0.05 THOUSAND/UL (ref 0–0.2)
IMM GRANULOCYTES NFR BLD AUTO: 1 % (ref 0–2)
KETONES UR STRIP-MCNC: NEGATIVE MG/DL
LEUKOCYTE ESTERASE UR QL STRIP: NEGATIVE
LIPASE SERPL-CCNC: 113 U/L (ref 73–393)
LYMPHOCYTES # BLD AUTO: 1.67 THOUSANDS/ΜL (ref 0.6–4.47)
LYMPHOCYTES NFR BLD AUTO: 24 % (ref 14–44)
MCH RBC QN AUTO: 33.1 PG (ref 26.8–34.3)
MCHC RBC AUTO-ENTMCNC: 33.6 G/DL (ref 31.4–37.4)
MCV RBC AUTO: 98 FL (ref 82–98)
MONOCYTES # BLD AUTO: 0.4 THOUSAND/ΜL (ref 0.17–1.22)
MONOCYTES NFR BLD AUTO: 6 % (ref 4–12)
NEUTROPHILS # BLD AUTO: 4.75 THOUSANDS/ΜL (ref 1.85–7.62)
NEUTS SEG NFR BLD AUTO: 67 % (ref 43–75)
NITRITE UR QL STRIP: NEGATIVE
NRBC BLD AUTO-RTO: 0 /100 WBCS
PH UR STRIP.AUTO: 6.5 [PH] (ref 4.5–8)
PLATELET # BLD AUTO: 325 THOUSANDS/UL (ref 149–390)
PMV BLD AUTO: 9.7 FL (ref 8.9–12.7)
POTASSIUM SERPL-SCNC: 3.7 MMOL/L (ref 3.5–5.3)
PROT SERPL-MCNC: 8.6 G/DL (ref 6.4–8.2)
PROT UR STRIP-MCNC: NEGATIVE MG/DL
RBC # BLD AUTO: 3.72 MILLION/UL (ref 3.88–5.62)
SODIUM SERPL-SCNC: 135 MMOL/L (ref 136–145)
SP GR UR STRIP.AUTO: 1.02 (ref 1–1.03)
UROBILINOGEN UR QL STRIP.AUTO: 4 E.U./DL
WBC # BLD AUTO: 7.05 THOUSAND/UL (ref 4.31–10.16)

## 2018-09-07 PROCEDURE — 93005 ELECTROCARDIOGRAM TRACING: CPT

## 2018-09-07 PROCEDURE — 81003 URINALYSIS AUTO W/O SCOPE: CPT

## 2018-09-07 PROCEDURE — 36415 COLL VENOUS BLD VENIPUNCTURE: CPT

## 2018-09-07 PROCEDURE — 99284 EMERGENCY DEPT VISIT MOD MDM: CPT

## 2018-09-07 PROCEDURE — 96361 HYDRATE IV INFUSION ADD-ON: CPT

## 2018-09-07 PROCEDURE — 80053 COMPREHEN METABOLIC PANEL: CPT | Performed by: EMERGENCY MEDICINE

## 2018-09-07 PROCEDURE — 83690 ASSAY OF LIPASE: CPT | Performed by: EMERGENCY MEDICINE

## 2018-09-07 PROCEDURE — 85025 COMPLETE CBC W/AUTO DIFF WBC: CPT | Performed by: EMERGENCY MEDICINE

## 2018-09-07 PROCEDURE — 96360 HYDRATION IV INFUSION INIT: CPT

## 2018-09-07 RX ORDER — HYOSCYAMINE SULFATE 0.12 MG/5ML
125 LIQUID ORAL EVERY 4 HOURS PRN
COMMUNITY
End: 2019-03-22 | Stop reason: ALTCHOICE

## 2018-09-07 RX ORDER — FAMOTIDINE 20 MG/1
20 TABLET, FILM COATED ORAL 2 TIMES DAILY
Qty: 30 TABLET | Refills: 0 | Status: SHIPPED | OUTPATIENT
Start: 2018-09-07 | End: 2019-03-22 | Stop reason: ALTCHOICE

## 2018-09-07 RX ORDER — FAMOTIDINE 20 MG/1
20 TABLET, FILM COATED ORAL ONCE
Status: COMPLETED | OUTPATIENT
Start: 2018-09-07 | End: 2018-09-07

## 2018-09-07 RX ORDER — MAGNESIUM HYDROXIDE/ALUMINUM HYDROXICE/SIMETHICONE 120; 1200; 1200 MG/30ML; MG/30ML; MG/30ML
20 SUSPENSION ORAL ONCE
Status: COMPLETED | OUTPATIENT
Start: 2018-09-07 | End: 2018-09-07

## 2018-09-07 RX ADMIN — SODIUM CHLORIDE 1000 ML: 0.9 INJECTION, SOLUTION INTRAVENOUS at 13:50

## 2018-09-07 RX ADMIN — ALUMINUM HYDROXIDE, MAGNESIUM HYDROXIDE, AND SIMETHICONE 20 ML: 200; 200; 20 SUSPENSION ORAL at 13:44

## 2018-09-07 RX ADMIN — LIDOCAINE HYDROCHLORIDE 15 ML: 20 SOLUTION ORAL; TOPICAL at 13:44

## 2018-09-07 RX ADMIN — FAMOTIDINE 20 MG: 20 TABLET ORAL at 13:43

## 2018-09-07 NOTE — ED ATTENDING ATTESTATION
Phylicia Galvan MD, saw and evaluated the patient  All available labs and X-rays were ordered by me or the resident and have been reviewed by myself  I discussed the patient with the resident / non-physician and agree with the resident's / non-physician practitioner's findings and plan as documented in the resident's / non-physician practicitioner's note, except where noted  At this point, I agree with the current assessment done in the ED  Chief Complaint   Patient presents with    Abdominal Pain     Pt " I woke up this morning with this really bad pain on the right side  I had this pain back in the begning of July and I got an ultrasound but I dont know the results  My next appointment is in October " Pt denies n/v,diarrhea, or CP     62 M:  - this is a 44-year-old male presenting for evaluation of epigastric pain  The patient states that he is feeling perfectly well yesterday, he was celebrating his birthday  He has a large amount of alcohol, cake, she foods  This morning when he woke up he felt he was having right upper quadrant and epigastric pain, felt like a burning sensation  He has had similar in the past   He had she had an ultrasound done in October which demonstrated a normal gallbladder  He has never had gallstones before  The pain is worse with movement, better if he stays perfectly still  If he bends across his abdomen the pain comes on again  If he is walking he does not particularly have pain  He denies any fevers chills chest pain shortness of breath  He denies any vomiting, denies any current nausea  Denies diaphoresis  Denies arm pain jaw pain back pain  Denies dizziness or lightheadedness  No diarrhea melena or hematochezia  He feels well apart from the epigastric discomfort  He told his girlfriend about it who told him to go to the emergency room to make sure everything is okay    - he did try Hycosamineat home which did not really help his symptoms  PMH:  - HTN  - HLD  - DM  - CAD/MI  - Hypothyroidism  PSH:  - none  Smokes daily  Occasional alcohol, including last night  No drug use  PE:  Vitals:    09/07/18 1204 09/07/18 1422 09/07/18 1530   BP: 146/78 135/66 130/67   BP Location: Left arm Left arm Left arm   Pulse: (!) 107 94 86   Resp: 22 18 16   Temp: 98 4 °F (36 9 °C)     TempSrc: Oral     SpO2: 98% 97% 98%   Weight: 82 6 kg (182 lb)     Height: 5' 6" (1 676 m)     General: VSS, NAD, awake, alert  Well-nourished, well-developed  Appears stated age  Speaking normally in full sentences  Head: Normocephalic, atraumatic, nontender  Eyes: PERRL, EOM-I  No diplopia  No hyphema  No subconjunctival hemorrhages  Symmetrical lids  ENT: Atraumatic external nose and ears  MMM  No malocclusion  No stridor  Normal phonation  No drooling  Normal swallowing  Neck: Symmetric, trachea midline  No JVD  CV: RRR  +S1/S2  No murmurs or gallops  Peripheral pulses +2 throughout  No chest wall tenderness  Lungs:   Unlabored No retractions  CTAB, lungs sounds equal bilateral    No tachypnea  Abd: +BS, soft  Focal tenderness in the epigastric region  ND   Heel strike negative  MSK:   FROM   Back:   No rashes  Skin: Dry, intact  Neuro: AAOx3, GCS 15, CN II-XII grossly intact  Motor grossly intact  Psychiatric/Behavioral: Appropriate mood and affect   Exam: deferred  A:  - Epigastric pain  P:  - discussed with the patient that I suspect he likely has alcoholic gastritis  Will do a GI cocktail, 1st nurse labs were done  He does have an acute kidney injury noted  Will give IV fluids for this  Likely discharge home after we feeling better with H2 blocker  Patient is in agreement with plan and has no questions at this time  - 13 point ROS was performed and all are normal unless stated in the history above  - Nursing note reviewed  Vitals reviewed     - Orders placed by myself and/or advanced practitioner / resident     - Previous chart was reviewed  - No language barrier    - History obtained from patient  - There are no limitations to the history obtained  - Critical care time: Not applicable for this patient  Final Diagnosis:  1   Gastritis      ED Course as of Sep 09 2344   Fri Sep 07, 2018   1339 Creatinine: (!) 1 39     Medications   lidocaine viscous (XYLOCAINE) 2 % mucosal solution 15 mL (15 mL Swish & Spit Given 9/7/18 1344)   famotidine (PEPCID) tablet 20 mg (20 mg Oral Given 9/7/18 1343)   aluminum-magnesium hydroxide-simethicone (MYLANTA) 200-200-20 mg/5 mL oral suspension 20 mL (20 mL Oral Given 9/7/18 1344)   sodium chloride 0 9 % bolus 1,000 mL (0 mL Intravenous Stopped 9/7/18 1607)     No orders to display     Orders Placed This Encounter   Procedures    CBC and differential    Comprehensive metabolic panel    Lipase    POCT urinalysis dipstick    ECG 12 lead    ECG 12 lead     Labs Reviewed   CBC AND DIFFERENTIAL - Abnormal        Result Value Ref Range Status    WBC 7 05  4 31 - 10 16 Thousand/uL Final    RBC 3 72 (*) 3 88 - 5 62 Million/uL Final    Hemoglobin 12 3  12 0 - 17 0 g/dL Final    Hematocrit 36 6  36 5 - 49 3 % Final    MCV 98  82 - 98 fL Final    MCH 33 1  26 8 - 34 3 pg Final    MCHC 33 6  31 4 - 37 4 g/dL Final    RDW 11 2 (*) 11 6 - 15 1 % Final    MPV 9 7  8 9 - 12 7 fL Final    Platelets 862  667 - 390 Thousands/uL Final    nRBC 0  /100 WBCs Final    Neutrophils Relative 67  43 - 75 % Final    Immat GRANS % 1  0 - 2 % Final    Lymphocytes Relative 24  14 - 44 % Final    Monocytes Relative 6  4 - 12 % Final    Eosinophils Relative 2  0 - 6 % Final    Basophils Relative 0  0 - 1 % Final    Neutrophils Absolute 4 75  1 85 - 7 62 Thousands/µL Final    Immature Grans Absolute 0 05  0 00 - 0 20 Thousand/uL Final    Lymphocytes Absolute 1 67  0 60 - 4 47 Thousands/µL Final    Monocytes Absolute 0 40  0 17 - 1 22 Thousand/µL Final    Eosinophils Absolute 0 15  0 00 - 0 61 Thousand/µL Final    Basophils Absolute 0 03  0 00 - 0 10 Thousands/µL Final   COMPREHENSIVE METABOLIC PANEL - Abnormal     Sodium 135 (*) 136 - 145 mmol/L Final    Potassium 3 7  3 5 - 5 3 mmol/L Final    Chloride 102  100 - 108 mmol/L Final    CO2 26  21 - 32 mmol/L Final    ANION GAP 7  4 - 13 mmol/L Final    BUN 28 (*) 5 - 25 mg/dL Final    Creatinine 1 39 (*) 0 60 - 1 30 mg/dL Final    Comment: Standardized to IDMS reference method    Glucose 194 (*) 65 - 140 mg/dL Final    Comment:   If the patient is fasting, the ADA then defines impaired fasting glucose as > 100 mg/dL and diabetes as > or equal to 123 mg/dL  Specimen collection should occur prior to Sulfasalazine administration due to the potential for falsely depressed results  Specimen collection should occur prior to Sulfapyridine administration due to the potential for falsely elevated results  Calcium 9 2  8 3 - 10 1 mg/dL Final    AST 18  5 - 45 U/L Final    Comment:   Specimen collection should occur prior to Sulfasalazine administration due to the potential for falsely depressed results  ALT 35  12 - 78 U/L Final    Comment:   Specimen collection should occur prior to Sulfasalazine and/or Sulfapyridine administration due to the potential for falsely depressed results  Alkaline Phosphatase 40 (*) 46 - 116 U/L Final    Total Protein 8 6 (*) 6 4 - 8 2 g/dL Final    Albumin 4 4  3 5 - 5 0 g/dL Final    Total Bilirubin 0 54  0 20 - 1 00 mg/dL Final    eGFR 55  ml/min/1 73sq m Final    Narrative:     National Kidney Disease Education Program recommendations are as follows:  GFR calculation is accurate only with a steady state creatinine  Chronic Kidney disease less than 60 ml/min/1 73 sq  meters  Kidney failure less than 15 ml/min/1 73 sq  meters     ED URINE MACROSCOPIC - Abnormal     Color, UA Yellow   Final    Clarity, UA Clear   Final    pH, UA 6 5  4 5 - 8 0 Final    Leukocytes, UA Negative  Negative Final    Nitrite, UA Negative  Negative Final    Protein, UA Negative  Negative mg/dl Final Glucose,  (1/2%) (*) Negative mg/dl Final    Ketones, UA Negative  Negative mg/dl Final    Urobilinogen, UA 4 0 (*) 0 2, 1 0 E U /dl E U /dl Final    Bilirubin, UA Negative  Negative Final    Blood, UA Negative  Negative Final    Specific Gravity, UA 1 020  1 003 - 1 030 Final    Narrative:     CLINITEK RESULT   LIPASE - Normal    Lipase 113  73 - 393 u/L Final   POCT URINALYSIS DIPSTICK - Normal   GREEN / BLACK TUBE ON HOLD    Extra Tube Hold for add-ons  Final    Comment: Auto resulted  Time reflects when diagnosis was documented in both MDM as applicable and the Disposition within this note     Time User Action Codes Description Comment    9/7/2018  3:40 PM Ba Rhodes Add [K29 70] Gastritis       ED Disposition     ED Disposition Condition Comment    Discharge  Brett Quinteros discharge to home/self care      Condition at discharge: Stable        Follow-up Information     Follow up With Specialties Details Why Contact Info Additional [address], MD Family Medicine  As scheduled Dru Naik [address]  [address]  [address] [facility] 31 Oriana Sarkar Tony, DO Gastroenterology In 3 days For further evaluation [facility]  [address]  [address] [facility] [address] [facility]       [address] [place] [address] Emergency Department Emergency Medicine  If symptoms worsen [address]  [phone]  ED, [address] [place], [place], [address]        Discharge Medication List as of 9/7/2018  3:41 PM      START taking these medications    Details   famotidine (PEPCID) 20 mg tablet Take 1 tablet (20 mg total) by mouth 2 (two) times a day, Starting Fri 9/7/2018, Print         CONTINUE these medications which have NOT CHANGED    Details   hyoscyamine (LEVSIN) 0 125 MG/5ML ELIX Take 125 mcg by mouth every 4 (four) hours as needed for bladder spasms, Historical Med      aspirin 81 MG tablet Take 81 mg by mouth daily, Historical Med      fenofibrate (TRICOR) 145 mg tablet Take 1 tablet (145 mg total) by mouth daily at bedtime, Starting Wed 8/22/2018, Until Thu 8/22/2019, Normal      glipiZIDE (GLUCOTROL) 5 mg tablet Take 5 mg by mouth 2 (two) times a day before meals, Historical Med      hydrochlorothiazide (MICROZIDE) 12 5 mg capsule Take 12 5 mg by mouth daily, Historical Med      Icosapent Ethyl (VASCEPA) 1 g CAPS Take 4 capsules by mouth daily, Starting Wed 12/6/2017, Historical Med      latanoprost (XALATAN) 0 005 % ophthalmic solution Apply 1 drop to eye daily at bedtime, Historical Med      levothyroxine 25 mcg tablet Starting Sat 3/17/2018, Historical Med      lisinopril (ZESTRIL) 40 mg tablet Take 40 mg by mouth daily, Starting Thu 9/13/2012, Historical Med      metoprolol tartrate (LOPRESSOR) 50 mg tablet TAKE ONE TABLET BY MOUTH TWICE DAILY, Normal      Multiple Vitamins-Minerals (MULTIVITAMIN ADULT PO) Take 1 tablet by mouth daily, Historical Med           No discharge procedures on file  Prior to Admission Medications   Prescriptions Last Dose Informant Patient Reported? Taking?    Icosapent Ethyl (VASCEPA) 1 g CAPS   Yes No   Sig: Take 4 capsules by mouth daily   Multiple Vitamins-Minerals (MULTIVITAMIN ADULT PO)   Yes No   Sig: Take 1 tablet by mouth daily   aspirin 81 MG tablet   Yes No   Sig: Take 81 mg by mouth daily   fenofibrate (TRICOR) 145 mg tablet   No No   Sig: Take 1 tablet (145 mg total) by mouth daily at bedtime   glipiZIDE (GLUCOTROL) 5 mg tablet   Yes No   Sig: Take 5 mg by mouth 2 (two) times a day before meals   hydrochlorothiazide (MICROZIDE) 12 5 mg capsule   Yes No   Sig: Take 12 5 mg by mouth daily   hyoscyamine (LEVSIN) 0 125 MG/5ML ELIX   Yes Yes   Sig: Take 125 mcg by mouth every 4 (four) hours as needed for bladder spasms   latanoprost (XALATAN) 0 005 % ophthalmic solution   Yes No   Sig: Apply 1 drop to eye daily at bedtime   levothyroxine 25 mcg tablet   Yes No   lisinopril (ZESTRIL) 40 mg tablet   Yes No   Sig: Take 40 mg by mouth daily   metoprolol tartrate (LOPRESSOR) 50 mg tablet   No No   Sig: TAKE ONE TABLET BY MOUTH TWICE DAILY      Facility-Administered Medications: None       Portions of the record may have been created with voice recognition software  Occasional wrong word or "sound a like" substitutions may have occurred due to the inherent limitations of voice recognition software  Read the chart carefully and recognize, using context, where substitutions have occurred      Electronically signed by:  Rafa Kaur

## 2018-09-07 NOTE — DISCHARGE INSTRUCTIONS
Gastritis   WHAT YOU NEED TO KNOW:   Gastritis is inflammation or irritation of the lining of your stomach  DISCHARGE INSTRUCTIONS:   Call 911 for any of the following:   · You develop chest pain or shortness of breath  Return to the emergency department if:   · You vomit blood  · You have black or bloody bowel movements  · You have severe stomach or back pain  Contact your healthcare provider if:   · You have a fever  · You have new or worsening symptoms, even after treatment  · You have questions or concerns about your condition or care  Medicines:   · Medicines  may be given to help treat a bacterial infection or decrease stomach acid  · Take your medicine as directed  Contact your healthcare provider if you think your medicine is not helping or if you have side effects  Tell him or her if you are allergic to any medicine  Keep a list of the medicines, vitamins, and herbs you take  Include the amounts, and when and why you take them  Bring the list or the pill bottles to follow-up visits  Carry your medicine list with you in case of an emergency  Manage or prevent gastritis:   · Do not smoke  Nicotine and other chemicals in cigarettes and cigars can make your symptoms worse and cause lung damage  Ask your healthcare provider for information if you currently smoke and need help to quit  E-cigarettes or smokeless tobacco still contain nicotine  Talk to your healthcare provider before you use these products  · Do not drink alcohol  Alcohol can prevent healing and make your gastritis worse  Talk to your healthcare provider if you need help to stop drinking  · Do not take NSAIDs or aspirin unless directed  These and similar medicines can cause irritation  If your healthcare provider says it is okay to take NSAIDs, take them with food  · Do not eat foods that cause irritation  Foods such as oranges and salsa can cause burning or pain  Eat a variety of healthy foods   Examples include fruits (not citrus), vegetables, low-fat dairy products, beans, whole-grain breads, and lean meats and fish  Try to eat small meals, and drink water with your meals  Do not eat for at least 3 hours before you go to bed  · Find ways to relax and decrease stress  Stress can increase stomach acid and make gastritis worse  Activities such as yoga, meditation, or listening to music can help you relax  Spend time with friends, or do things you enjoy  Follow up with your healthcare provider as directed: You may need ongoing tests or treatment, or referral to a gastroenterologist  Write down your questions so you remember to ask them during your visits  © 2017 [employer] Information is for End User's use only and may not be sold, redistributed or otherwise used for commercial purposes  All illustrations and images included in CareNotes® are the copyrighted property of [employer]  or Ankush Velazquez  The above information is an  only  It is not intended as medical advice for individual conditions or treatments  Talk to your doctor, nurse or pharmacist before following any medical regimen to see if it is safe and effective for you

## 2018-09-08 LAB
ATRIAL RATE: 99 BPM
P AXIS: 66 DEGREES
PR INTERVAL: 152 MS
QRS AXIS: 23 DEGREES
QRSD INTERVAL: 104 MS
QT INTERVAL: 356 MS
QTC INTERVAL: 456 MS
T WAVE AXIS: 35 DEGREES
VENTRICULAR RATE: 99 BPM

## 2018-09-08 PROCEDURE — 93010 ELECTROCARDIOGRAM REPORT: CPT | Performed by: INTERNAL MEDICINE

## 2018-09-09 NOTE — ED PROVIDER NOTES
History  Chief Complaint   Patient presents with    Abdominal Pain     Pt " I woke up this morning with this really bad pain on the right side  I had this pain back in the begning of July and I got an ultrasound but I dont know the results  My next appointment is in October " Pt denies n/v,diarrhea, or CP     80-year-old male presents for evaluation of acute on chronic epigastric and right upper quadrant abdominal pain that started this morning  Patient reports he has been having similar pain for the last 2 months  He was evaluated with a formal ultrasound which displayed no acute abnormalities  He states he was celebrating his birthday yesterday and drank lot of alcohol and greasy foods  Denies associated vomiting, chest pain, shortness of breath  Normal bowel movements  Has not taken any over-the-counter medications  Pain is aggravated with palpation  Prior to Admission Medications   Prescriptions Last Dose Informant Patient Reported? Taking?    Icosapent Ethyl (VASCEPA) 1 g CAPS   Yes No   Sig: Take 4 capsules by mouth daily   Multiple Vitamins-Minerals (MULTIVITAMIN ADULT PO)   Yes No   Sig: Take 1 tablet by mouth daily   aspirin 81 MG tablet   Yes No   Sig: Take 81 mg by mouth daily   fenofibrate (TRICOR) 145 mg tablet   No No   Sig: Take 1 tablet (145 mg total) by mouth daily at bedtime   glipiZIDE (GLUCOTROL) 5 mg tablet   Yes No   Sig: Take 5 mg by mouth 2 (two) times a day before meals   hydrochlorothiazide (MICROZIDE) 12 5 mg capsule   Yes No   Sig: Take 12 5 mg by mouth daily   hyoscyamine (LEVSIN) 0 125 MG/5ML ELIX   Yes Yes   Sig: Take 125 mcg by mouth every 4 (four) hours as needed for bladder spasms   latanoprost (XALATAN) 0 005 % ophthalmic solution   Yes No   Sig: Apply 1 drop to eye daily at bedtime   levothyroxine 25 mcg tablet   Yes No   lisinopril (ZESTRIL) 40 mg tablet   Yes No   Sig: Take 40 mg by mouth daily   metoprolol tartrate (LOPRESSOR) 50 mg tablet   No No   Sig: TAKE ONE TABLET BY MOUTH TWICE DAILY      Facility-Administered Medications: None       Past Medical History:   Diagnosis Date    Cardiac disease     Diabetes mellitus ([facility] )     Disease of thyroid gland     Hyperlipidemia     Hypertension     Myocardial infarct ([facility] )     2006       History reviewed  No pertinent surgical history  History reviewed  No pertinent family history  I have reviewed and agree with the history as documented  Social History   Substance Use Topics    Smoking status: Current Some Day Smoker     Packs/day: 0 25    Smokeless tobacco: Never Used    Alcohol use Yes      Comment: weekends        Review of Systems   Constitutional: Negative for chills, diaphoresis and fever  HENT: Negative for congestion and rhinorrhea  Eyes: Negative for pain and visual disturbance  Respiratory: Negative for cough, shortness of breath and wheezing  Cardiovascular: Negative for chest pain and leg swelling  Gastrointestinal: Positive for abdominal pain  Negative for diarrhea, nausea and vomiting  Genitourinary: Negative for difficulty urinating, dysuria, frequency and urgency  Musculoskeletal: Negative for back pain and neck pain  Skin: Negative for color change and rash  Neurological: Negative for syncope, numbness and headaches  All other systems reviewed and are negative  Physical Exam  ED Triage Vitals [09/07/18 1204]   Temperature Pulse Respirations Blood Pressure SpO2   98 4 °F (36 9 °C) (!) 107 22 146/78 98 %      Temp Source Heart Rate Source Patient Position - Orthostatic VS BP Location FiO2 (%)   Oral Monitor Sitting Left arm --      Pain Score       8           Orthostatic Vital Signs  Vitals:    09/07/18 1204 09/07/18 1422 09/07/18 1530   BP: 146/78 135/66 130/67   Pulse: (!) 107 94 86   Patient Position - Orthostatic VS: Sitting Lying Lying       Physical Exam   Constitutional: He is oriented to person, place, and time  He appears well-developed and well-nourished   No distress  HENT:   Head: Normocephalic and atraumatic  Eyes: Conjunctivae and EOM are normal    Neck: Normal range of motion  Neck supple  Cardiovascular: Normal rate and regular rhythm  Pulmonary/Chest: Effort normal and breath sounds normal  No respiratory distress  He has no wheezes  He has no rales  Abdominal: Soft  Bowel sounds are normal  There is tenderness  There is no guarding  Mild tenderness epigastrium> right upper quadrant  No guarding no rebound   Musculoskeletal: Normal range of motion  He exhibits no edema or tenderness  Neurological: He is alert and oriented to person, place, and time  No cranial nerve deficit  Skin: Skin is warm  He is not diaphoretic  No erythema  Psychiatric: He has a normal mood and affect  His behavior is normal    Nursing note and vitals reviewed        ED Medications  Medications   lidocaine viscous (XYLOCAINE) 2 % mucosal solution 15 mL (15 mL Swish & Spit Given 9/7/18 1344)   famotidine (PEPCID) tablet 20 mg (20 mg Oral Given 9/7/18 1343)   aluminum-magnesium hydroxide-simethicone (MYLANTA) 200-200-20 mg/5 mL oral suspension 20 mL (20 mL Oral Given 9/7/18 1344)   sodium chloride 0 9 % bolus 1,000 mL (0 mL Intravenous Stopped 9/7/18 1607)       Diagnostic Studies  Results Reviewed     Procedure Component Value Units Date/Time    POCT urinalysis dipstick [[id number]]  (Normal) Resulted:  09/07/18 1540    Lab Status:  Final result Specimen:  Urine from Urine, Other Updated:  09/07/18 1540    ED Urine Macroscopic [[id number]]  (Abnormal) Collected:  09/07/18 1550    Lab Status:  Final result Specimen:  Urine Updated:  09/07/18 1536     Color, UA Yellow     Clarity, UA Clear     pH, UA 6 5     Leukocytes, UA Negative     Nitrite, UA Negative     Protein, UA Negative mg/dl      Glucose,  (1/2%) (A) mg/dl      Ketones, UA Negative mg/dl      Urobilinogen, UA 4 0 (A) E U /dl      Bilirubin, UA Negative     Blood, UA Negative     Specific [place], UA 1 020 Narrative:       CLINITEK RESULT    Comprehensive metabolic panel [[id number]]  (Abnormal) Collected:  09/07/18 1212    Lab Status:  Final result Specimen:  Blood from Arm, Left Updated:  09/07/18 1243     Sodium 135 (L) mmol/L      Potassium 3 7 mmol/L      Chloride 102 mmol/L      CO2 26 mmol/L      ANION GAP 7 mmol/L      BUN 28 (H) mg/dL      Creatinine 1 39 (H) mg/dL      Glucose 194 (H) mg/dL      Calcium 9 2 mg/dL      AST 18 U/L      ALT 35 U/L      Alkaline Phosphatase 40 (L) U/L      Total Protein 8 6 (H) g/dL      Albumin 4 4 g/dL      Total Bilirubin 0 54 mg/dL      eGFR 55 ml/min/1 73sq m     Narrative:         National Kidney Disease Education Program recommendations are as follows:  GFR calculation is accurate only with a steady state creatinine  Chronic Kidney disease less than 60 ml/min/1 73 sq  meters  Kidney failure less than 15 ml/min/1 73 sq  meters      Lipase [[id number]]  (Normal) Collected:  09/07/18 1212    Lab Status:  Final result Specimen:  Blood from Arm, Left Updated:  09/07/18 1243     Lipase 113 u/L     CBC and differential [[id number]]  (Abnormal) Collected:  09/07/18 1212    Lab Status:  Final result Specimen:  Blood from Arm, Left Updated:  09/07/18 1227     WBC 7 05 Thousand/uL      RBC 3 72 (L) Million/uL      Hemoglobin 12 3 g/dL      Hematocrit 36 6 %      MCV 98 fL      MCH 33 1 pg      MCHC 33 6 g/dL      RDW 11 2 (L) %      MPV 9 7 fL      Platelets 490 Thousands/uL      nRBC 0 /100 WBCs      Neutrophils Relative 67 %      Immat GRANS % 1 %      Lymphocytes Relative 24 %      Monocytes Relative 6 %      Eosinophils Relative 2 %      Basophils Relative 0 %      Neutrophils Absolute 4 75 Thousands/µL      Immature Grans Absolute 0 05 Thousand/uL      Lymphocytes Absolute 1 67 Thousands/µL      Monocytes Absolute 0 40 Thousand/µL      Eosinophils Absolute 0 15 Thousand/µL      Basophils Absolute 0 03 Thousands/µL                  No orders to display Procedures  Procedures      Phone Consults  ED Phone Contact    ED Course  ED Course as of Sep 09 0038   Fri Sep 07, 2018   1335 Creatinine: (!) 1 39   1540 Glucose, UA: (!) 500 (1/2%)                               MDM  Number of Diagnoses or Management Options  Gastritis:   Diagnosis management comments: 77-year-old male presenting with epigastric and right upper quadrant abdominal pain likely secondary to gastritis  Will obtain labs including CMP, lipase, CBC and GI cocktail  Administer fluids for CARLY  Patient reports significant improvement of symptoms after medication  Will discharge with follow-up to gastroenterology and prescription for Pepcid  Patient aware of high glucose and glucosuria  He states he did not take his diabetes medications today  Will follow up with primary care provider for glucose and creatinine recheck  CritCare Time    Disposition  Final diagnoses:   Gastritis     Time reflects when diagnosis was documented in both MDM as applicable and the Disposition within this note     Time User Action Codes Description Comment    9/7/2018  3:40 PM Cheikh Vazquez Add [K29 70] Gastritis       ED Disposition     ED Disposition Condition Comment    Discharge  Wylene Power discharge to home/self care      Condition at discharge: Stable        Follow-up Information     Follow up With Specialties Details Why Contact Info Additional [address], MD Family Medicine  As scheduled [address]  [address] [place] [address], DO Gastroenterology In 3 days For further evaluation 2639 [facility]  [address] [facility]       1551 [facility] 34 [facility] Emergency Department Emergency Medicine  If symptoms worsen [address]  [phone]  ED, [address] [place] [address], [address]          Discharge Medication List as of 9/7/2018  3:41 PM      START taking these medications    Details   famotidine (PEPCID) 20 mg tablet Take 1 tablet (20 mg total) by mouth 2 (two) times a day, Starting Fri 9/7/2018, Print         CONTINUE these medications which have NOT CHANGED    Details   hyoscyamine (LEVSIN) 0 125 MG/5ML ELIX Take 125 mcg by mouth every 4 (four) hours as needed for bladder spasms, Historical Med      aspirin 81 MG tablet Take 81 mg by mouth daily, Historical Med      fenofibrate (TRICOR) 145 mg tablet Take 1 tablet (145 mg total) by mouth daily at bedtime, Starting Wed 8/22/2018, Until Thu 8/22/2019, Normal      glipiZIDE (GLUCOTROL) 5 mg tablet Take 5 mg by mouth 2 (two) times a day before meals, Historical Med      hydrochlorothiazide (MICROZIDE) 12 5 mg capsule Take 12 5 mg by mouth daily, Historical Med      Icosapent Ethyl (VASCEPA) 1 g CAPS Take 4 capsules by mouth daily, Starting Wed 12/6/2017, Historical Med      latanoprost (XALATAN) 0 005 % ophthalmic solution Apply 1 drop to eye daily at bedtime, Historical Med      levothyroxine 25 mcg tablet Starting Sat 3/17/2018, Historical Med      lisinopril (ZESTRIL) 40 mg tablet Take 40 mg by mouth daily, Starting Thu 9/13/2012, Historical Med      metoprolol tartrate (LOPRESSOR) 50 mg tablet TAKE ONE TABLET BY MOUTH TWICE DAILY, Normal      Multiple Vitamins-Minerals (MULTIVITAMIN ADULT PO) Take 1 tablet by mouth daily, Historical Med           No discharge procedures on file  ED Provider  Attending physically available and evaluated Nely Yuen I managed the patient along with the ED Attending      Electronically Signed by         Anna Moss DO  09/09/18 0251

## 2018-09-28 ENCOUNTER — APPOINTMENT (OUTPATIENT)
Dept: LAB | Facility: HOSPITAL | Age: 58
End: 2018-09-28
Attending: FAMILY MEDICINE
Payer: COMMERCIAL

## 2018-09-28 ENCOUNTER — TRANSCRIBE ORDERS (OUTPATIENT)
Dept: LAB | Facility: HOSPITAL | Age: 58
End: 2018-09-28

## 2018-09-28 DIAGNOSIS — E88.9 METABOLIC DISEASE: ICD-10-CM

## 2018-09-28 DIAGNOSIS — R73.01 IMPAIRED FASTING BLOOD SUGAR: ICD-10-CM

## 2018-09-28 DIAGNOSIS — E03.9 HYPOTHYROIDISM, UNSPECIFIED TYPE: Primary | ICD-10-CM

## 2018-09-28 DIAGNOSIS — E03.9 HYPOTHYROIDISM, UNSPECIFIED TYPE: ICD-10-CM

## 2018-09-28 LAB
25(OH)D3 SERPL-MCNC: 35 NG/ML (ref 30–100)
ANION GAP SERPL CALCULATED.3IONS-SCNC: 5 MMOL/L (ref 4–13)
BUN SERPL-MCNC: 20 MG/DL (ref 5–25)
CALCIUM SERPL-MCNC: 8.7 MG/DL (ref 8.3–10.1)
CHLORIDE SERPL-SCNC: 103 MMOL/L (ref 100–108)
CHOLEST SERPL-MCNC: 133 MG/DL (ref 50–200)
CO2 SERPL-SCNC: 27 MMOL/L (ref 21–32)
CREAT SERPL-MCNC: 1.01 MG/DL (ref 0.6–1.3)
EST. AVERAGE GLUCOSE BLD GHB EST-MCNC: 103 MG/DL
GFR SERPL CREATININE-BSD FRML MDRD: 82 ML/MIN/1.73SQ M
GLUCOSE P FAST SERPL-MCNC: 104 MG/DL (ref 65–99)
HBA1C MFR BLD: 5.2 % (ref 4.2–6.3)
HDLC SERPL-MCNC: 27 MG/DL (ref 40–60)
LDLC SERPL CALC-MCNC: 72 MG/DL (ref 0–100)
NONHDLC SERPL-MCNC: 106 MG/DL
POTASSIUM SERPL-SCNC: 3.5 MMOL/L (ref 3.5–5.3)
SODIUM SERPL-SCNC: 135 MMOL/L (ref 136–145)
TRIGL SERPL-MCNC: 172 MG/DL
TSH SERPL DL<=0.05 MIU/L-ACNC: 1.25 UIU/ML (ref 0.36–3.74)

## 2018-09-28 PROCEDURE — 82306 VITAMIN D 25 HYDROXY: CPT

## 2018-09-28 PROCEDURE — 84443 ASSAY THYROID STIM HORMONE: CPT

## 2018-09-28 PROCEDURE — 80061 LIPID PANEL: CPT

## 2018-09-28 PROCEDURE — 80048 BASIC METABOLIC PNL TOTAL CA: CPT

## 2018-09-28 PROCEDURE — 83036 HEMOGLOBIN GLYCOSYLATED A1C: CPT

## 2018-09-28 PROCEDURE — 36415 COLL VENOUS BLD VENIPUNCTURE: CPT

## 2018-10-16 DIAGNOSIS — E78.5 DYSLIPIDEMIA: Primary | ICD-10-CM

## 2018-10-17 RX ORDER — ICOSAPENT ETHYL 1000 MG/1
CAPSULE ORAL
Qty: 120 CAPSULE | Refills: 5 | Status: SHIPPED | OUTPATIENT
Start: 2018-10-17 | End: 2019-04-27 | Stop reason: SDUPTHER

## 2018-12-05 ENCOUNTER — HOSPITAL ENCOUNTER (OUTPATIENT)
Dept: NON INVASIVE DIAGNOSTICS | Facility: CLINIC | Age: 58
Discharge: HOME | End: 2018-12-05
Payer: OTHER MISCELLANEOUS

## 2018-12-05 ENCOUNTER — TRANSCRIBE ORDERS (OUTPATIENT)
Dept: ADMINISTRATIVE | Facility: HOSPITAL | Age: 58
End: 2018-12-05

## 2018-12-05 DIAGNOSIS — I82.4Y1 DEEP VEIN THROMBOSIS (DVT) OF PROXIMAL VEIN OF RIGHT LOWER EXTREMITY, UNSPECIFIED CHRONICITY (HCC): Primary | ICD-10-CM

## 2018-12-05 DIAGNOSIS — I82.4Y1 DEEP VEIN THROMBOSIS (DVT) OF PROXIMAL VEIN OF RIGHT LOWER EXTREMITY, UNSPECIFIED CHRONICITY (HCC): ICD-10-CM

## 2018-12-05 PROCEDURE — 93971 EXTREMITY STUDY: CPT | Performed by: RADIOLOGY

## 2018-12-05 PROCEDURE — 93971 EXTREMITY STUDY: CPT

## 2019-01-26 ENCOUNTER — TRANSCRIBE ORDERS (OUTPATIENT)
Dept: LAB | Facility: HOSPITAL | Age: 59
End: 2019-01-26

## 2019-01-30 ENCOUNTER — TRANSCRIBE ORDERS (OUTPATIENT)
Dept: LAB | Facility: HOSPITAL | Age: 59
End: 2019-01-30

## 2019-01-30 ENCOUNTER — APPOINTMENT (OUTPATIENT)
Dept: LAB | Facility: HOSPITAL | Age: 59
End: 2019-01-30
Attending: FAMILY MEDICINE
Payer: COMMERCIAL

## 2019-01-30 DIAGNOSIS — I51.9 MYXEDEMA HEART DISEASE: ICD-10-CM

## 2019-01-30 DIAGNOSIS — E88.81 DYSMETABOLIC SYNDROME X: ICD-10-CM

## 2019-01-30 DIAGNOSIS — E03.9 MYXEDEMA HEART DISEASE: ICD-10-CM

## 2019-01-30 DIAGNOSIS — R79.89 HYPOURICEMIA: Primary | ICD-10-CM

## 2019-01-30 LAB
ALBUMIN SERPL BCP-MCNC: 4.2 G/DL (ref 3.5–5)
ALP SERPL-CCNC: 40 U/L (ref 46–116)
ALT SERPL W P-5'-P-CCNC: 75 U/L (ref 12–78)
ANION GAP SERPL CALCULATED.3IONS-SCNC: 7 MMOL/L (ref 4–13)
AST SERPL W P-5'-P-CCNC: 43 U/L (ref 5–45)
BILIRUB SERPL-MCNC: 0.74 MG/DL (ref 0.2–1)
BUN SERPL-MCNC: 26 MG/DL (ref 5–25)
CALCIUM SERPL-MCNC: 9.1 MG/DL (ref 8.3–10.1)
CHLORIDE SERPL-SCNC: 101 MMOL/L (ref 100–108)
CHOLEST SERPL-MCNC: 250 MG/DL (ref 50–200)
CO2 SERPL-SCNC: 25 MMOL/L (ref 21–32)
CREAT SERPL-MCNC: 1.2 MG/DL (ref 0.6–1.3)
EST. AVERAGE GLUCOSE BLD GHB EST-MCNC: 197 MG/DL
GFR SERPL CREATININE-BSD FRML MDRD: 66 ML/MIN/1.73SQ M
GLUCOSE P FAST SERPL-MCNC: 282 MG/DL (ref 65–99)
HBA1C MFR BLD: 8.5 % (ref 4.2–6.3)
HDLC SERPL-MCNC: 19 MG/DL (ref 40–60)
INSULIN SERPL-ACNC: 15.9 MU/L (ref 3–25)
NONHDLC SERPL-MCNC: 231 MG/DL
POTASSIUM SERPL-SCNC: 3.8 MMOL/L (ref 3.5–5.3)
PROT SERPL-MCNC: 8 G/DL (ref 6.4–8.2)
SODIUM SERPL-SCNC: 133 MMOL/L (ref 136–145)
TRIGL SERPL-MCNC: 1683 MG/DL
TSH SERPL DL<=0.05 MIU/L-ACNC: 3.58 UIU/ML (ref 0.36–3.74)

## 2019-01-30 PROCEDURE — 80061 LIPID PANEL: CPT

## 2019-01-30 PROCEDURE — 83525 ASSAY OF INSULIN: CPT

## 2019-01-30 PROCEDURE — 83036 HEMOGLOBIN GLYCOSYLATED A1C: CPT

## 2019-01-30 PROCEDURE — 80053 COMPREHEN METABOLIC PANEL: CPT

## 2019-01-30 PROCEDURE — 84443 ASSAY THYROID STIM HORMONE: CPT

## 2019-01-30 PROCEDURE — 36415 COLL VENOUS BLD VENIPUNCTURE: CPT

## 2019-01-30 PROCEDURE — 84402 ASSAY OF FREE TESTOSTERONE: CPT

## 2019-01-30 PROCEDURE — 84403 ASSAY OF TOTAL TESTOSTERONE: CPT

## 2019-01-31 LAB
TESTOST FREE SERPL-MCNC: 12.2 PG/ML (ref 7.2–24)
TESTOST SERPL-MCNC: 127 NG/DL (ref 264–916)

## 2019-03-20 ENCOUNTER — OFFICE VISIT (OUTPATIENT)
Dept: SLEEP CENTER | Facility: CLINIC | Age: 59
End: 2019-03-20
Payer: COMMERCIAL

## 2019-03-20 VITALS
HEART RATE: 78 BPM | HEIGHT: 66 IN | SYSTOLIC BLOOD PRESSURE: 112 MMHG | DIASTOLIC BLOOD PRESSURE: 80 MMHG | WEIGHT: 181 LBS | BODY MASS INDEX: 29.09 KG/M2

## 2019-03-20 DIAGNOSIS — G47.33 OSA (OBSTRUCTIVE SLEEP APNEA): Primary | ICD-10-CM

## 2019-03-20 PROCEDURE — 99214 OFFICE O/P EST MOD 30 MIN: CPT | Performed by: INTERNAL MEDICINE

## 2019-03-20 NOTE — PROGRESS NOTES
Progress Note - Sleep Center   Angela River PDS:[birth year] MRN: [id number]      Reason for Visit:  62 y o male here for annual follow-up    Assessment:  Doing well on current therapy of BiPAP 13/9 cm for very severe MALA (AHI = 45)  His BiPAP device is not functioning properly  Plan:  I will order the patient a new BiPAP machine at the same setting  He would do better with an [employer] rather than an F 20, which irritates the bridge of his nose  Follow up: One year    History of Present Illness:  History of MALA on PAP therapy  Fully compliant and deriving benefit  Review of Systems      Genitourinary none   Cardiology none   Gastrointestinal none   Neurology frequent headaches and numbness/tingling of an extremity   Constitutional weight change   Integumentary none   Psychiatry none   Musculoskeletal none   Pulmonary frequent cough   ENT none   Endocrine none   Hematological none           I have reviewed and updated the review of systems as necessary      Historical Information    Past Medical History:   Past Medical History:   Diagnosis Date    Cardiac disease     [facility] )     Disease of thyroid gland     Hyperlipidemia     Hypertension     Myocardial infarct ([facility] )     2006         Past Surgical History: No past surgical history on file      Social History:   Social History     Socioeconomic History    Marital status:      Spouse name: None    Number of children: None    Years of education: None    Highest education level: None   Occupational History    None   Social Needs    Financial resource strain: None    Food insecurity:     Worry: None     Inability: None    Transportation needs:     Medical: None     Non-medical: None   Tobacco Use    Smoking status: Current Some Day Smoker     Packs/day: 0 25    Smokeless tobacco: Never Used   Substance and Sexual Activity    Alcohol use: Yes     Comment: weekends    Drug use: No    Sexual activity: None Lifestyle    Physical activity:     Days per week: None     Minutes per session: None    Stress: None   Relationships    Social connections:     Talks on phone: None     Gets together: None     Attends Yarsanism service: None     Active member of club or organization: None     Attends meetings of clubs or organizations: None     Relationship status: None    Intimate partner violence:     Fear of current or ex partner: None     Emotionally abused: None     Physically abused: None     Forced sexual activity: None   Other Topics Concern    None   Social History Narrative    None       Family History: No family history on file      Medications/Allergies:      Current Outpatient Medications:     aspirin 81 MG tablet, Take 81 mg by mouth daily, Disp: , Rfl:     famotidine (PEPCID) 20 mg tablet, Take 1 tablet (20 mg total) by mouth 2 (two) times a day, Disp: 30 tablet, Rfl: 0    fenofibrate (TRICOR) 145 mg tablet, Take 1 tablet (145 mg total) by mouth daily at bedtime, Disp: 30 tablet, Rfl: 11    hydrochlorothiazide (MICROZIDE) 12 5 mg capsule, Take 12 5 mg by mouth daily, Disp: , Rfl:     hyoscyamine (LEVSIN) 0 125 MG/5ML ELIX, Take 125 mcg by mouth every 4 (four) hours as needed for bladder spasms, Disp: , Rfl:     latanoprost (XALATAN) 0 005 % ophthalmic solution, Apply 1 drop to eye daily at bedtime, Disp: , Rfl:     levothyroxine 25 mcg tablet, , Disp: , Rfl:     lisinopril (ZESTRIL) 40 mg tablet, Take 40 mg by mouth daily, Disp: , Rfl:     metFORMIN (GLUCOPHAGE) 500 mg tablet, Take 500 mg by mouth 2 (two) times a day with meals, Disp: , Rfl:     metoprolol tartrate (LOPRESSOR) 50 mg tablet, TAKE ONE TABLET BY MOUTH TWICE DAILY, Disp: 60 tablet, Rfl: 11    Multiple Vitamins-Minerals (MULTIVITAMIN ADULT PO), Take 1 tablet by mouth daily, Disp: , Rfl:     VASCEPA 1 g CAPS, TAKE FOUR CAPSULES BY MOUTH ONCE DAILY, Disp: 120 capsule, Rfl: 5          Objective      Vital Signs:   Vitals:    03/20/19 1000 BP: 112/80   Pulse: 78     [place] Sleepiness Scale: Total score: 3        Physical Exam:    General: Alert, appropriate, cooperative, overweight    Head: NC/AT    Skin: Warm, dry    Neuro: No motor abnormalities, cranial nerves appear intact    Extremity: No clubbing, cyanosis      DME Provider: [employer]        Counseling / Coordination of Care   I have spent 20 minutes with Patient  today in which greater than 50% of this time was spent in counseling/coordination of care regarding Intructions for management                Board Certified Sleep Specialist

## 2019-03-22 ENCOUNTER — OFFICE VISIT (OUTPATIENT)
Dept: CARDIOLOGY CLINIC | Facility: CLINIC | Age: 59
End: 2019-03-22
Payer: COMMERCIAL

## 2019-03-22 VITALS
HEIGHT: 66 IN | BODY MASS INDEX: 29.25 KG/M2 | OXYGEN SATURATION: 98 % | DIASTOLIC BLOOD PRESSURE: 70 MMHG | HEART RATE: 70 BPM | SYSTOLIC BLOOD PRESSURE: 102 MMHG | WEIGHT: 182 LBS

## 2019-03-22 DIAGNOSIS — I10 ESSENTIAL HYPERTENSION: Primary | ICD-10-CM

## 2019-03-22 DIAGNOSIS — E78.5 DYSLIPIDEMIA: ICD-10-CM

## 2019-03-22 PROCEDURE — 99214 OFFICE O/P EST MOD 30 MIN: CPT | Performed by: INTERNAL MEDICINE

## 2019-03-22 NOTE — PROGRESS NOTES
Cardiology   Zina Morales 62 y o  male MRN: [id number]      Impression:  1  Essential hypertriglyceridemia - elevated 1/19  2  Hypertension - good control  3  DVT - was on Xarelto  Due to cast on RLE           Recommendations:  1  Continue current medications  2  Check fasting lipid panel and complete metabolic profile  3  Follow up in 6 months          HPI: Zina Morales is a 62y o  year old male with essential hypertriglyceridemia and hypertension who presents for follow up  Major complaint is arthritis  No chest pain, shortness of breath, or palpitations  Charla Chuck at work and fractured right leg - developed DVT of right leg  Review of Systems   Constitutional: Negative  HENT: Negative  Eyes: Negative  Respiratory: Negative for chest tightness and shortness of breath  Cardiovascular: Negative for chest pain, palpitations and leg swelling  Gastrointestinal: Negative  Endocrine: Negative  Genitourinary: Negative  Musculoskeletal: Negative  R foot pain   Skin: Negative  Allergic/Immunologic: Negative  Neurological: Negative  Hematological: Negative  Psychiatric/Behavioral: Negative  All other systems reviewed and are negative  Past Medical History:   Diagnosis Date    Cardiac disease     Diabetes mellitus ([facility] )     Disease of thyroid gland     Hyperlipidemia     Hypertension     Myocardial infarct ([facility] )     2006     No past surgical history on file  Social History     Substance and Sexual Activity   Alcohol Use Yes    Comment: weekends     Social History     Substance and Sexual Activity   Drug Use No     Social History     Tobacco Use   Smoking Status Current Some Day Smoker    Packs/day: 0 25   Smokeless Tobacco Never Used     No family history on file      Allergies:  No Known Allergies    Medications:     Current Outpatient Medications:     aspirin 81 MG tablet, Take 81 mg by mouth daily, Disp: , Rfl:     fenofibrate (TRICOR) 145 mg tablet, Take 1 tablet (145 mg total) by mouth daily at bedtime, Disp: 30 tablet, Rfl: 11    hydrochlorothiazide (MICROZIDE) 12 5 mg capsule, Take 12 5 mg by mouth daily, Disp: , Rfl:     latanoprost (XALATAN) 0 005 % ophthalmic solution, Apply 1 drop to eye daily at bedtime, Disp: , Rfl:     levothyroxine 25 mcg tablet, , Disp: , Rfl:     lisinopril (ZESTRIL) 40 mg tablet, Take 40 mg by mouth daily, Disp: , Rfl:     metFORMIN (GLUCOPHAGE) 500 mg tablet, Take 500 mg by mouth 4 (four) times a day (with meals and at bedtime) Take 2 tables at AM and 2 tablets PM, Disp: , Rfl:     metoprolol tartrate (LOPRESSOR) 50 mg tablet, TAKE ONE TABLET BY MOUTH TWICE DAILY, Disp: 60 tablet, Rfl: 11    VASCEPA 1 g CAPS, TAKE FOUR CAPSULES BY MOUTH ONCE DAILY, Disp: 120 capsule, Rfl: 5    Multiple Vitamins-Minerals (MULTIVITAMIN ADULT PO), Take 1 tablet by mouth daily, Disp: , Rfl:       Wt Readings from Last 3 Encounters:   03/22/19 82 6 kg (182 lb)   03/20/19 82 1 kg (181 lb)   09/07/18 82 6 kg (182 lb)     Temp Readings from Last 3 Encounters:   09/07/18 98 4 °F (36 9 °C) (Oral)   10/23/17 98 2 °F (36 8 °C)     BP Readings from Last 3 Encounters:   03/22/19 102/70   03/20/19 112/80   09/07/18 130/67     Pulse Readings from Last 3 Encounters:   03/22/19 70   03/20/19 78   09/07/18 86         Physical Exam   Constitutional: He is oriented to person, place, and time  He appears well-developed  HENT:   Head: Atraumatic  Eyes: EOM are normal    Neck: Normal range of motion  Cardiovascular: Normal rate, regular rhythm and normal heart sounds  Exam reveals no gallop and no friction rub  No murmur heard  Pulmonary/Chest: Effort normal and breath sounds normal  No stridor  No respiratory distress  He has no wheezes  Abdominal: Soft  Musculoskeletal: Normal range of motion  Neurological: He is alert and oriented to person, place, and time  Skin: Skin is warm and dry  Psychiatric: He has a normal mood and affect  Laboratory Studies:  CMP:  Lab Results   Component Value Date     12/30/2014    K 3 8 01/30/2019     01/30/2019    CO2 25 01/30/2019    ANIONGAP 7 12/30/2014    BUN 26 (H) 01/30/2019    CREATININE 1 20 01/30/2019    GLUCOSE 130 12/30/2014    AST 43 01/30/2019    ALT 75 01/30/2019    BILITOT 0 58 12/30/2014    EGFR 66 01/30/2019       Lipid Profile:   Lab Results   Component Value Date    CHOL 160 12/30/2014     Lab Results   Component Value Date    HDL 19 (L) 01/30/2019     Lab Results   Component Value Date    [facility]  01/30/2019      Comment:      Calculated LDL invalid, triglycerides >400 mg/dl  This screening LDL is a calculated result  It does not have the accuracy of the Direct Measured LDL in the monitoring of patients with hyperlipidemia and/or statin therapy  Direct Measure LDL (WNE491) must be ordered separately in these patients       Lab Results   Component Value Date    TRIG 1,683 (H) 01/30/2019

## 2019-04-04 ENCOUNTER — APPOINTMENT (OUTPATIENT)
Dept: LAB | Facility: HOSPITAL | Age: 59
End: 2019-04-04
Attending: FAMILY MEDICINE
Payer: COMMERCIAL

## 2019-04-04 ENCOUNTER — TRANSCRIBE ORDERS (OUTPATIENT)
Dept: LAB | Facility: HOSPITAL | Age: 59
End: 2019-04-04

## 2019-04-04 DIAGNOSIS — I51.9 MYXEDEMA HEART DISEASE: ICD-10-CM

## 2019-04-04 DIAGNOSIS — E78.5 DYSLIPIDEMIA: ICD-10-CM

## 2019-04-04 DIAGNOSIS — E03.9 MYXEDEMA HEART DISEASE: ICD-10-CM

## 2019-04-04 DIAGNOSIS — E55.9 AVITAMINOSIS D: ICD-10-CM

## 2019-04-04 DIAGNOSIS — R73.01 IMPAIRED FASTING GLUCOSE: ICD-10-CM

## 2019-04-04 DIAGNOSIS — E11.9 TYPE 2 DIABETES MELLITUS WITHOUT COMPLICATION, UNSPECIFIED WHETHER LONG TERM INSULIN USE (HCC): Primary | ICD-10-CM

## 2019-04-04 DIAGNOSIS — I10 ESSENTIAL HYPERTENSION, MALIGNANT: ICD-10-CM

## 2019-04-04 DIAGNOSIS — E11.9 TYPE 2 DIABETES MELLITUS WITHOUT COMPLICATION, UNSPECIFIED WHETHER LONG TERM INSULIN USE (HCC): ICD-10-CM

## 2019-04-04 LAB
25(OH)D3 SERPL-MCNC: 68.2 NG/ML (ref 30–100)
ALBUMIN SERPL BCP-MCNC: 4.8 G/DL (ref 3.5–5)
ALP SERPL-CCNC: 33 U/L (ref 46–116)
ALT SERPL W P-5'-P-CCNC: 68 U/L (ref 12–78)
ANION GAP SERPL CALCULATED.3IONS-SCNC: 7 MMOL/L (ref 4–13)
AST SERPL W P-5'-P-CCNC: 33 U/L (ref 5–45)
BILIRUB SERPL-MCNC: 0.54 MG/DL (ref 0.2–1)
BUN SERPL-MCNC: 29 MG/DL (ref 5–25)
CALCIUM SERPL-MCNC: 9.7 MG/DL (ref 8.3–10.1)
CHLORIDE SERPL-SCNC: 105 MMOL/L (ref 100–108)
CHOLEST SERPL-MCNC: 191 MG/DL (ref 50–200)
CO2 SERPL-SCNC: 26 MMOL/L (ref 21–32)
CREAT SERPL-MCNC: 1.44 MG/DL (ref 0.6–1.3)
EST. AVERAGE GLUCOSE BLD GHB EST-MCNC: 103 MG/DL
GFR SERPL CREATININE-BSD FRML MDRD: 53 ML/MIN/1.73SQ M
GLUCOSE P FAST SERPL-MCNC: 119 MG/DL (ref 65–99)
HBA1C MFR BLD: 5.2 % (ref 4.2–6.3)
HDLC SERPL-MCNC: 30 MG/DL (ref 40–60)
LDLC SERPL CALC-MCNC: 103 MG/DL (ref 0–100)
POTASSIUM SERPL-SCNC: 3.7 MMOL/L (ref 3.5–5.3)
PROT SERPL-MCNC: 8.6 G/DL (ref 6.4–8.2)
SODIUM SERPL-SCNC: 138 MMOL/L (ref 136–145)
TRIGL SERPL-MCNC: 291 MG/DL
TSH SERPL DL<=0.05 MIU/L-ACNC: 1.34 UIU/ML (ref 0.36–3.74)

## 2019-04-04 PROCEDURE — 80053 COMPREHEN METABOLIC PANEL: CPT | Performed by: INTERNAL MEDICINE

## 2019-04-04 PROCEDURE — 82306 VITAMIN D 25 HYDROXY: CPT

## 2019-04-04 PROCEDURE — 36415 COLL VENOUS BLD VENIPUNCTURE: CPT | Performed by: INTERNAL MEDICINE

## 2019-04-04 PROCEDURE — 83036 HEMOGLOBIN GLYCOSYLATED A1C: CPT

## 2019-04-04 PROCEDURE — 80061 LIPID PANEL: CPT

## 2019-04-04 PROCEDURE — 84443 ASSAY THYROID STIM HORMONE: CPT

## 2019-04-26 ENCOUNTER — TRANSCRIBE ORDERS (OUTPATIENT)
Dept: ADMINISTRATIVE | Facility: HOSPITAL | Age: 59
End: 2019-04-26

## 2019-04-26 DIAGNOSIS — R10.9 ABDOMINAL PAIN, UNSPECIFIED ABDOMINAL LOCATION: Primary | ICD-10-CM

## 2019-04-27 DIAGNOSIS — E78.5 DYSLIPIDEMIA: ICD-10-CM

## 2019-04-28 RX ORDER — ICOSAPENT ETHYL 1000 MG/1
CAPSULE ORAL
Qty: 120 CAPSULE | Refills: 5 | Status: SHIPPED | OUTPATIENT
Start: 2019-04-28 | End: 2019-10-25 | Stop reason: SDUPTHER

## 2019-05-07 ENCOUNTER — HOSPITAL ENCOUNTER (OUTPATIENT)
Dept: RADIOLOGY | Facility: HOSPITAL | Age: 59
Discharge: HOME | End: 2019-05-07
Attending: FAMILY MEDICINE
Payer: COMMERCIAL

## 2019-05-07 DIAGNOSIS — R10.9 ABDOMINAL PAIN, UNSPECIFIED ABDOMINAL LOCATION: ICD-10-CM

## 2019-05-07 PROCEDURE — 74160 CT ABDOMEN W/CONTRAST: CPT

## 2019-05-07 RX ADMIN — IOHEXOL 100 ML: 350 INJECTION, SOLUTION INTRAVENOUS at 19:19

## 2019-07-15 ENCOUNTER — OFFICE VISIT (OUTPATIENT)
Dept: GASTROENTEROLOGY | Facility: MEDICAL CENTER | Age: 59
End: 2019-07-15
Payer: COMMERCIAL

## 2019-07-15 VITALS
HEART RATE: 70 BPM | DIASTOLIC BLOOD PRESSURE: 60 MMHG | SYSTOLIC BLOOD PRESSURE: 150 MMHG | BODY MASS INDEX: 27.32 KG/M2 | TEMPERATURE: 98.7 F | HEIGHT: 66 IN | WEIGHT: 170 LBS

## 2019-07-15 DIAGNOSIS — K21.9 GASTROESOPHAGEAL REFLUX DISEASE WITHOUT ESOPHAGITIS: ICD-10-CM

## 2019-07-15 DIAGNOSIS — Z86.010 HISTORY OF COLON POLYPS: ICD-10-CM

## 2019-07-15 DIAGNOSIS — K82.4 GALLBLADDER POLYP: ICD-10-CM

## 2019-07-15 DIAGNOSIS — K76.0 NAFLD (NONALCOHOLIC FATTY LIVER DISEASE): Primary | ICD-10-CM

## 2019-07-15 PROCEDURE — 99244 OFF/OP CNSLTJ NEW/EST MOD 40: CPT | Performed by: INTERNAL MEDICINE

## 2019-07-15 NOTE — PATIENT INSTRUCTIONS
Colonoscopy and EGD scheduled on 8/26/2019 with Dr Elida Morgan at [address] instructions given to patient  PT is diabetic

## 2019-07-15 NOTE — PROGRESS NOTES
[facility] - Outpatient Consultation  Seda Méndez 62 y o  male MRN: [id number]  Encounter: [id number]          ASSESSMENT AND PLAN:      1  History of colon polyps  - Colonoscopy; Future  Last colonoscopy from risk of colonic polyps  2  Gastroesophageal reflux disease without esophagitis  - EGD; Future  Has history reflux disease  Discuss lifestyle and dietary modification  3  NAFLD (nonalcoholic fatty liver disease)    -recommend the concerning coffee 2 times daily  -weight loss of 7-10%  -will check blood work with fibrosis may consider checking elastography  - alcoholic cessation    4  Gallbladder polyp- gallbladder polyps measuring approximately 10 millimeters  This will need repeat ultrasound in a year and further evaluation to consider cholecystectomy  I will discuss this further with him ICU for EGD and colonoscopy evaluation  ______________________________________________________________________    HPI:    He is a 59-year-old male presents here for evaluation of reflux disease, history of colonic polyps, and history of fatty liver disease  He had right upper quadrant ultrasound on 07/02/2018 which showed evidence of gallbladder polyp and fatty liver disease  Recent ultrasound showed gallbladder polyp which measured 10 millimeters  This also identified fatty liver disease  His BMI is 27  He has also intensely lost weight  He does have history longstanding social alcohol intake  He is planning on cutting back  Does have history of colonic polyps and due for colonoscopy now due to this  REVIEW OF SYSTEMS:    CONSTITUTIONAL: Denies any fever, chills, rigors, and weight loss  HEENT: No earache or tinnitus  Denies hearing loss or visual disturbances  CARDIOVASCULAR: No chest pain or palpitations  RESPIRATORY: Denies any cough, hemoptysis, shortness of breath or dyspnea on exertion  GASTROINTESTINAL: As noted in the History of Present Illness     GENITOURINARY: No problems with urination  Denies any hematuria or dysuria  NEUROLOGIC: No dizziness or vertigo, denies headaches  MUSCULOSKELETAL: Denies any muscle or joint pain  SKIN: Denies skin rashes or itching  ENDOCRINE: Denies excessive thirst  Denies intolerance to heat or cold  PSYCHOSOCIAL: Denies depression or anxiety  Denies any recent memory loss  Historical Information   Past Medical History:   Diagnosis Date    Cardiac disease     Colon polyp     [facility] )     Disease of thyroid gland     Hyperlipidemia     Hypertension     Myocardial infarct ([facility] )     2006     Past Surgical History:   Procedure Laterality Date    COLONOSCOPY       Social History   Social History     Substance and Sexual Activity   Alcohol Use Yes    Comment: weekends     Social History     Substance and Sexual Activity   Drug Use No     Social History     Tobacco Use   Smoking Status Current Some Day Smoker    Packs/day: 0 25   Smokeless Tobacco Never Used     History reviewed  No pertinent family history  Meds/Allergies       Current Outpatient Medications:     aspirin 81 MG tablet    fenofibrate (TRICOR) 145 mg tablet    hydrochlorothiazide (MICROZIDE) 12 5 mg capsule    latanoprost (XALATAN) 0 005 % ophthalmic solution    levothyroxine 25 mcg tablet    lisinopril (ZESTRIL) 40 mg tablet    metFORMIN (GLUCOPHAGE) 500 mg tablet    metoprolol tartrate (LOPRESSOR) 50 mg tablet    Multiple Vitamins-Minerals (MULTIVITAMIN ADULT PO)    VASCEPA 1 g CAPS    No Known Allergies        Objective     Blood pressure 150/60, pulse 70, temperature 98 7 °F (37 1 °C), temperature source Tympanic, height 5' 6" (1 676 m), weight 77 1 kg (170 lb)  Body mass index is 27 44 kg/m²          PHYSICAL EXAM:      General Appearance:   Alert, cooperative, no distress   HEENT:   Normocephalic, atraumatic, anicteric      Neck:  Supple, symmetrical, trachea midline   Lungs:   Clear to auscultation bilaterally; no rales, rhonchi or wheezing; respirations unlabored    Heart[de-identified]   Regular rate and rhythm; no murmur, rub, or gallop  Abdomen:   Soft, non-tender, non-distended; normal bowel sounds; no masses, no organomegaly    Genitalia:   Deferred    Rectal:   Deferred    Extremities:  No cyanosis, clubbing or edema    Pulses:  2+ and symmetric    Skin:  No jaundice, rashes, or lesions    Lymph nodes:  No palpable cervical lymphadenopathy        Lab Results:   No visits with results within 1 Day(s) from this visit  Latest known visit with results is:   Appointment on 04/04/2019   Component Date Value    TSH 3RD GENERATON 04/04/2019 1 340     Vit D, 25-Hydroxy 04/04/2019 68 2     Cholesterol 04/04/2019 191     Triglycerides 04/04/2019 291*    HDL, Direct 04/04/2019 30*    LDL Calculated 04/04/2019 103*         Radiology Results:   No results found

## 2019-07-16 PROBLEM — K82.4 GALLBLADDER POLYP: Status: ACTIVE | Noted: 2019-07-16

## 2019-07-24 ENCOUNTER — TRANSCRIBE ORDERS (OUTPATIENT)
Dept: RADIOLOGY | Facility: HOSPITAL | Age: 59
End: 2019-07-24

## 2019-07-24 ENCOUNTER — APPOINTMENT (OUTPATIENT)
Dept: LAB | Facility: HOSPITAL | Age: 59
End: 2019-07-24
Attending: INTERNAL MEDICINE
Payer: COMMERCIAL

## 2019-07-24 ENCOUNTER — HOSPITAL ENCOUNTER (OUTPATIENT)
Dept: RADIOLOGY | Facility: HOSPITAL | Age: 59
Discharge: HOME | End: 2019-07-24
Attending: INTERNAL MEDICINE
Payer: COMMERCIAL

## 2019-07-24 DIAGNOSIS — K76.0 NAFLD (NONALCOHOLIC FATTY LIVER DISEASE): ICD-10-CM

## 2019-07-24 LAB
INR PPP: 1 (ref 0.84–1.19)
PROTHROMBIN TIME: 12.8 SECONDS (ref 11.6–14.5)

## 2019-07-24 PROCEDURE — 36415 COLL VENOUS BLD VENIPUNCTURE: CPT

## 2019-07-24 PROCEDURE — 76981 USE PARENCHYMA: CPT

## 2019-07-24 PROCEDURE — 85610 PROTHROMBIN TIME: CPT

## 2019-08-14 ENCOUNTER — TELEPHONE (OUTPATIENT)
Dept: GASTROENTEROLOGY | Facility: AMBULARY SURGERY CENTER | Age: 59
End: 2019-08-14

## 2019-08-14 NOTE — TELEPHONE ENCOUNTER
DR MIGUEL'S PT    Pt called cancel his procedure at this time due to no escort or ride  Pt will call back when ready

## 2019-08-28 ENCOUNTER — OFFICE VISIT (OUTPATIENT)
Dept: SLEEP CENTER | Facility: CLINIC | Age: 59
End: 2019-08-28
Payer: COMMERCIAL

## 2019-08-28 VITALS
DIASTOLIC BLOOD PRESSURE: 60 MMHG | BODY MASS INDEX: 27.8 KG/M2 | HEIGHT: 66 IN | WEIGHT: 173 LBS | SYSTOLIC BLOOD PRESSURE: 108 MMHG

## 2019-08-28 DIAGNOSIS — G47.33 OSA (OBSTRUCTIVE SLEEP APNEA): Primary | ICD-10-CM

## 2019-08-28 PROCEDURE — 99214 OFFICE O/P EST MOD 30 MIN: CPT | Performed by: INTERNAL MEDICINE

## 2019-08-28 NOTE — PROGRESS NOTES
Progress Note - Sleep Center   Ally Deaestephania KNIGHT:[birth year] MRN: [id number]      Reason for Visit:  62 y o male here for PAP compliance check    Assessment:  Doing well with new PAP device  Sleep quality is improved and the patient feels less drowsy  Compliance data show utilization for greater than or equal to 70% of nights, for greater than or equal to 4 hours per night  Plan:  Adequate compliance and successful treatment    Follow up: One year    History of Present Illness:  History of MALA on PAP therapy  Meets adequate compliance          Review of Systems      Genitourinary none   Cardiology none   Gastrointestinal none   Neurology none   Constitutional weight change   Integumentary none   Psychiatry none   Musculoskeletal none   Pulmonary none   ENT none   Endocrine none   Hematological none           I have reviewed and updated the review of systems as necessary    Historical Information    Past Medical History:   Diagnosis Date    Cardiac disease     Colon polyp     [facility] mellitus [facility] )     Disease of thyroid gland     Hyperlipidemia     Hypertension     Myocardial infarct ([facility] )     2006         Past Surgical History:   Procedure Laterality Date    COLONOSCOPY           Social History     Socioeconomic History    Marital status:      Spouse name: None    Number of children: None    Years of education: None    Highest education level: None   Occupational History    None   Social Needs    Financial resource strain: None    Food insecurity:     Worry: None     Inability: None    Transportation needs:     Medical: None     Non-medical: None   Tobacco Use    Smoking status: Current Some Day Smoker     Packs/day: 0 25    Smokeless tobacco: Current User   Substance and Sexual Activity    Alcohol use: Yes     Comment: weekends    Drug use: No    Sexual activity: None   Lifestyle    Physical activity:     Days per week: None     Minutes per session: None    Stress: None Relationships    Social connections:     Talks on phone: None     Gets together: None     Attends Latter day service: None     Active member of club or organization: None     Attends meetings of clubs or organizations: None     Relationship status: None    Intimate partner violence:     Fear of current or ex partner: None     Emotionally abused: None     Physically abused: None     Forced sexual activity: None   Other Topics Concern    None   Social History Narrative    None         No family history on file  Medications/Allergies:      Current Outpatient Medications:     aspirin 81 MG tablet, Take 81 mg by mouth daily, Disp: , Rfl:     hydrochlorothiazide (MICROZIDE) 12 5 mg capsule, Take 12 5 mg by mouth daily, Disp: , Rfl:     latanoprost (XALATAN) 0 005 % ophthalmic solution, Apply 1 drop to eye daily at bedtime, Disp: , Rfl:     levothyroxine 25 mcg tablet, , Disp: , Rfl:     lisinopril (ZESTRIL) 40 mg tablet, Take 40 mg by mouth daily, Disp: , Rfl:     metFORMIN (GLUCOPHAGE) 500 mg tablet, Take 500 mg by mouth 4 (four) times a day (with meals and at bedtime) Take 2 tables at AM and 2 tablets PM, Disp: , Rfl:     metoprolol tartrate (LOPRESSOR) 50 mg tablet, TAKE ONE TABLET BY MOUTH TWICE DAILY, Disp: 60 tablet, Rfl: 11    Multiple Vitamins-Minerals (MULTIVITAMIN ADULT PO), Take 1 tablet by mouth daily, Disp: , Rfl:     VASCEPA 1 g CAPS, TAKE 4 CAPSULES BY MOUTH ONCE DAILY, Disp: 120 capsule, Rfl: 5    fenofibrate (TRICOR) 145 mg tablet, Take 1 tablet (145 mg total) by mouth daily at bedtime, Disp: 30 tablet, Rfl: 11    Na Sulfate-K Sulfate-Mg Sulf (SUPREP BOWEL PREP KIT) 17 5-3 13-1 6 GM/177ML SOLN, Take 177 mL by mouth once for 1 dose, Disp: 2 Bottle, Rfl: 0      Objective    Vital Signs:   Vitals:    08/28/19 1545   BP: 108/60     [place] Sleepiness Scale:  Total score: 1        Physical Exam:    General: Alert, appropriate, cooperative, overweight    Head: NC/AT    Skin: Warm, dry    Neuro: No motor abnormalities, cranial nerves appear intact    Extremity: No clubbing, cyanosis    PAP setting:   BiPAP 13/9 cm  DME Provider: [employer]  Test results:  Unvailable    Counseling / Coordination of Care  Total clinic time spent today 20 min  A description of the counseling / coordination of care: Maintain compliance  Discussed equipment  ROSALINO Hdz    Board Certified Sleep Specialist

## 2019-09-03 DIAGNOSIS — I10 ESSENTIAL HYPERTENSION: ICD-10-CM

## 2019-09-03 DIAGNOSIS — I48.91 ATRIAL FIBRILLATION, UNSPECIFIED TYPE (HCC): ICD-10-CM

## 2019-09-03 RX ORDER — METOPROLOL TARTRATE 50 MG/1
TABLET, FILM COATED ORAL
Qty: 60 TABLET | Refills: 11 | Status: SHIPPED | OUTPATIENT
Start: 2019-09-03

## 2019-09-03 RX ORDER — FENOFIBRATE 145 MG/1
TABLET, COATED ORAL
Qty: 30 TABLET | Refills: 11 | Status: SHIPPED | OUTPATIENT
Start: 2019-09-03

## 2019-10-15 ENCOUNTER — TELEPHONE (OUTPATIENT)
Dept: CARDIOLOGY CLINIC | Facility: CLINIC | Age: 59
End: 2019-10-15

## 2019-10-15 DIAGNOSIS — I10 ESSENTIAL HYPERTENSION: ICD-10-CM

## 2019-10-15 DIAGNOSIS — E78.5 DYSLIPIDEMIA: Primary | ICD-10-CM

## 2019-10-19 ENCOUNTER — APPOINTMENT (OUTPATIENT)
Dept: LAB | Facility: HOSPITAL | Age: 59
End: 2019-10-19
Attending: INTERNAL MEDICINE
Payer: COMMERCIAL

## 2019-10-19 DIAGNOSIS — E78.5 DYSLIPIDEMIA: ICD-10-CM

## 2019-10-19 LAB
ALBUMIN SERPL BCP-MCNC: 4.5 G/DL (ref 3.5–5)
ALP SERPL-CCNC: 32 U/L (ref 46–116)
ALT SERPL W P-5'-P-CCNC: 45 U/L (ref 12–78)
ANION GAP SERPL CALCULATED.3IONS-SCNC: 4 MMOL/L (ref 4–13)
AST SERPL W P-5'-P-CCNC: 18 U/L (ref 5–45)
BILIRUB SERPL-MCNC: 0.74 MG/DL (ref 0.2–1)
BUN SERPL-MCNC: 19 MG/DL (ref 5–25)
CALCIUM SERPL-MCNC: 9.4 MG/DL (ref 8.3–10.1)
CHLORIDE SERPL-SCNC: 108 MMOL/L (ref 100–108)
CHOLEST SERPL-MCNC: 152 MG/DL (ref 50–200)
CO2 SERPL-SCNC: 29 MMOL/L (ref 21–32)
CREAT SERPL-MCNC: 1.04 MG/DL (ref 0.6–1.3)
GFR SERPL CREATININE-BSD FRML MDRD: 78 ML/MIN/1.73SQ M
GLUCOSE P FAST SERPL-MCNC: 120 MG/DL (ref 65–99)
HDLC SERPL-MCNC: 32 MG/DL (ref 40–60)
LDLC SERPL CALC-MCNC: 84 MG/DL (ref 0–100)
POTASSIUM SERPL-SCNC: 3.6 MMOL/L (ref 3.5–5.3)
PROT SERPL-MCNC: 7.5 G/DL (ref 6.4–8.2)
SODIUM SERPL-SCNC: 141 MMOL/L (ref 136–145)
TRIGL SERPL-MCNC: 181 MG/DL

## 2019-10-19 PROCEDURE — 80061 LIPID PANEL: CPT

## 2019-10-19 PROCEDURE — 36415 COLL VENOUS BLD VENIPUNCTURE: CPT

## 2019-10-19 PROCEDURE — 80053 COMPREHEN METABOLIC PANEL: CPT

## 2019-10-25 ENCOUNTER — OFFICE VISIT (OUTPATIENT)
Dept: CARDIOLOGY CLINIC | Facility: CLINIC | Age: 59
End: 2019-10-25
Payer: COMMERCIAL

## 2019-10-25 VITALS
HEIGHT: 66 IN | BODY MASS INDEX: 28.67 KG/M2 | HEART RATE: 88 BPM | WEIGHT: 178.4 LBS | DIASTOLIC BLOOD PRESSURE: 80 MMHG | SYSTOLIC BLOOD PRESSURE: 122 MMHG

## 2019-10-25 DIAGNOSIS — O22.30 DVT (DEEP VEIN THROMBOSIS) IN PREGNANCY: ICD-10-CM

## 2019-10-25 DIAGNOSIS — E78.5 DYSLIPIDEMIA: ICD-10-CM

## 2019-10-25 DIAGNOSIS — I10 ESSENTIAL HYPERTENSION: Primary | ICD-10-CM

## 2019-10-25 PROCEDURE — 3079F DIAST BP 80-89 MM HG: CPT | Performed by: INTERNAL MEDICINE

## 2019-10-25 PROCEDURE — 3074F SYST BP LT 130 MM HG: CPT | Performed by: INTERNAL MEDICINE

## 2019-10-25 PROCEDURE — 99214 OFFICE O/P EST MOD 30 MIN: CPT | Performed by: INTERNAL MEDICINE

## 2019-10-25 RX ORDER — GABAPENTIN 300 MG/1
300 CAPSULE ORAL DAILY
COMMUNITY

## 2019-10-25 RX ORDER — ICOSAPENT ETHYL 1000 MG/1
4 CAPSULE ORAL DAILY
Qty: 120 CAPSULE | Refills: 5 | Status: SHIPPED | OUTPATIENT
Start: 2019-10-25

## 2019-10-25 NOTE — PROGRESS NOTES
Cardiology   Diann Salgado 61 y o  male MRN: [id number]        Impression:  1  Essential hypertriglyceridemia - good control  2  Hypertension - good control  3  DVT - was on Xarelto  Due to cast on RLE           Recommendations:  1  Continue current medications  2  Follow up in 6 months  HPI: Diann Salgado is a 61y o  year old male with essential hypertriglyceridemia and hypertension who presents for follow up  Major complaint is arthritis   No chest pain, shortness of breath, or palpitations  [place] at work and fractured right leg last year - developed DVT of right leg  Still with leg  Review of Systems   Constitutional: Negative  HENT: Negative  Eyes: Negative  Respiratory: Negative for chest tightness and shortness of breath  Cardiovascular: Negative for chest pain, palpitations and leg swelling  Gastrointestinal: Negative  Endocrine: Negative  Genitourinary: Negative  Musculoskeletal: Negative  Skin: Negative  Allergic/Immunologic: Negative  Neurological: Negative  Hematological: Negative  Psychiatric/Behavioral: Negative  All other systems reviewed and are negative  Past Medical History:   Diagnosis Date    Cardiac disease     Colon polyp     [facility] ([facility] )     Disease of thyroid gland     Hyperlipidemia     Hypertension     Myocardial infarct ([facility] )     2006     Past Surgical History:   Procedure Laterality Date    COLONOSCOPY       Social History     Substance and Sexual Activity   Alcohol Use Yes    Comment: weekends     Social History     Substance and Sexual Activity   Drug Use No     Social History     Tobacco Use   Smoking Status Current Some Day Smoker    Packs/day: 0 25   Smokeless Tobacco Current User     History reviewed  No pertinent family history      Allergies:  No Known Allergies    Medications:     Current Outpatient Medications:     aspirin 81 MG tablet, Take 81 mg by mouth daily, Disp: , Rfl:     fenofibrate (TRICOR) 145 mg tablet, TAKE 1 TABLET BY MOUTH ONCE DAILY AT BEDTIME, Disp: 30 tablet, Rfl: 11    gabapentin (NEURONTIN) 300 mg capsule, Take 300 mg by mouth daily, Disp: , Rfl:     hydrochlorothiazide (MICROZIDE) 12 5 mg capsule, Take 12 5 mg by mouth daily, Disp: , Rfl:     latanoprost (XALATAN) 0 005 % ophthalmic solution, Apply 1 drop to eye daily at bedtime, Disp: , Rfl:     levothyroxine 25 mcg tablet, , Disp: , Rfl:     lisinopril (ZESTRIL) 40 mg tablet, Take 40 mg by mouth daily, Disp: , Rfl:     metFORMIN (GLUCOPHAGE) 500 mg tablet, Take 500 mg by mouth 4 (four) times a day (with meals and at bedtime) Take 2 tables at AM and 2 tablets PM, Disp: , Rfl:     metoprolol tartrate (LOPRESSOR) 50 mg tablet, TAKE 1 TABLET BY MOUTH TWICE DAILY, Disp: 60 tablet, Rfl: 11    Multiple Vitamins-Minerals (MULTIVITAMIN ADULT PO), Take 1 tablet by mouth daily, Disp: , Rfl:     VASCEPA 1 g CAPS, TAKE 4 CAPSULES BY MOUTH ONCE DAILY, Disp: 120 capsule, Rfl: 5    Na Sulfate-K Sulfate-Mg Sulf (SUPREP BOWEL PREP KIT) 17 5-3 13-1 6 GM/177ML SOLN, Take 177 mL by mouth once for 1 dose, Disp: 2 Bottle, Rfl: 0      Wt Readings from Last 3 Encounters:   10/25/19 80 9 kg (178 lb 6 4 oz)   08/28/19 78 5 kg (173 lb)   07/15/19 77 1 kg (170 lb)     Temp Readings from Last 3 Encounters:   07/15/19 98 7 °F (37 1 °C) (Tympanic)   09/07/18 98 4 °F (36 9 °C) (Oral)   10/23/17 98 2 °F (36 8 °C)     BP Readings from Last 3 Encounters:   10/25/19 122/80   08/28/19 108/60   07/15/19 150/60     Pulse Readings from Last 3 Encounters:   10/25/19 88   07/15/19 70   03/22/19 70         Physical Exam   Constitutional: He is oriented to person, place, and time  He appears well-developed  HENT:   Head: Atraumatic  Eyes: EOM are normal    Neck: Normal range of motion  Cardiovascular: Normal rate, regular rhythm and normal heart sounds  Exam reveals no gallop and no friction rub  No murmur heard    Pulmonary/Chest: Effort normal and breath sounds normal    Abdominal: Soft  Musculoskeletal: Normal range of motion  Neurological: He is alert and oriented to person, place, and time  Skin: Skin is warm and dry  Psychiatric: He has a normal mood and affect           Laboratory Studies:  CMP:  Lab Results   Component Value Date     12/30/2014    K 3 6 10/19/2019     10/19/2019    CO2 29 10/19/2019    ANIONGAP 7 12/30/2014    BUN 19 10/19/2019    CREATININE 1 04 10/19/2019    GLUCOSE 130 12/30/2014    AST 18 10/19/2019    ALT 45 10/19/2019    BILITOT 0 58 12/30/2014    EGFR 78 10/19/2019       Lipid Profile:   Lab Results   Component Value Date    CHOL 160 12/30/2014     Lab Results   Component Value Date    HDL 32 (L) 10/19/2019     Lab Results   Component Value Date    LDLCALC 84 10/19/2019     Lab Results   Component Value Date    TRIG 181 (H) 10/19/2019

## 2019-12-10 DIAGNOSIS — I10 ESSENTIAL HYPERTENSION: Primary | ICD-10-CM

## 2019-12-10 RX ORDER — LISINOPRIL 40 MG/1
40 TABLET ORAL DAILY
Qty: 30 TABLET | Refills: 9 | Status: SHIPPED | OUTPATIENT
Start: 2019-12-10

## 2019-12-22 ENCOUNTER — APPOINTMENT (EMERGENCY)
Dept: RADIOLOGY | Facility: HOSPITAL | Age: 59
End: 2019-12-22

## 2019-12-22 ENCOUNTER — HOSPITAL ENCOUNTER (EMERGENCY)
Facility: HOSPITAL | Age: 59
Discharge: HOME | End: 2019-12-22
Attending: EMERGENCY MEDICINE | Admitting: EMERGENCY MEDICINE

## 2019-12-22 VITALS
HEIGHT: 66 IN | DIASTOLIC BLOOD PRESSURE: 78 MMHG | WEIGHT: 171.52 LBS | SYSTOLIC BLOOD PRESSURE: 148 MMHG | OXYGEN SATURATION: 99 % | HEART RATE: 65 BPM | RESPIRATION RATE: 16 BRPM | TEMPERATURE: 98 F | BODY MASS INDEX: 27.57 KG/M2

## 2019-12-22 DIAGNOSIS — K63.89 EPIPLOIC APPENDAGITIS: ICD-10-CM

## 2019-12-22 DIAGNOSIS — R10.9 ABDOMINAL PAIN: Primary | ICD-10-CM

## 2019-12-22 LAB
ALBUMIN SERPL BCP-MCNC: 4.6 G/DL (ref 3.5–5)
ALP SERPL-CCNC: 37 U/L (ref 46–116)
ALT SERPL W P-5'-P-CCNC: 67 U/L (ref 12–78)
ANION GAP SERPL CALCULATED.3IONS-SCNC: 3 MMOL/L (ref 4–13)
AST SERPL W P-5'-P-CCNC: 29 U/L (ref 5–45)
BASOPHILS # BLD AUTO: 0.07 THOUSANDS/ΜL (ref 0–0.1)
BASOPHILS NFR BLD AUTO: 1 % (ref 0–1)
BILIRUB SERPL-MCNC: 0.71 MG/DL (ref 0.2–1)
BUN SERPL-MCNC: 25 MG/DL (ref 5–25)
CALCIUM SERPL-MCNC: 10.4 MG/DL (ref 8.3–10.1)
CHLORIDE SERPL-SCNC: 107 MMOL/L (ref 100–108)
CO2 SERPL-SCNC: 32 MMOL/L (ref 21–32)
CREAT SERPL-MCNC: 1.27 MG/DL (ref 0.6–1.3)
EOSINOPHIL # BLD AUTO: 0.24 THOUSAND/ΜL (ref 0–0.61)
EOSINOPHIL NFR BLD AUTO: 3 % (ref 0–6)
ERYTHROCYTE [DISTWIDTH] IN BLOOD BY AUTOMATED COUNT: 11.3 % (ref 11.6–15.1)
GFR SERPL CREATININE-BSD FRML MDRD: 61 ML/MIN/1.73SQ M
GLUCOSE SERPL-MCNC: 173 MG/DL (ref 65–140)
HCT VFR BLD AUTO: 38.6 % (ref 36.5–49.3)
HGB BLD-MCNC: 13 G/DL (ref 12–17)
IMM GRANULOCYTES # BLD AUTO: 0.05 THOUSAND/UL (ref 0–0.2)
IMM GRANULOCYTES NFR BLD AUTO: 1 % (ref 0–2)
LIPASE SERPL-CCNC: 114 U/L (ref 73–393)
LYMPHOCYTES # BLD AUTO: 2.08 THOUSANDS/ΜL (ref 0.6–4.47)
LYMPHOCYTES NFR BLD AUTO: 22 % (ref 14–44)
MCH RBC QN AUTO: 33.9 PG (ref 26.8–34.3)
MCHC RBC AUTO-ENTMCNC: 33.7 G/DL (ref 31.4–37.4)
MCV RBC AUTO: 101 FL (ref 82–98)
MONOCYTES # BLD AUTO: 0.88 THOUSAND/ΜL (ref 0.17–1.22)
MONOCYTES NFR BLD AUTO: 9 % (ref 4–12)
NEUTROPHILS # BLD AUTO: 6.01 THOUSANDS/ΜL (ref 1.85–7.62)
NEUTS SEG NFR BLD AUTO: 64 % (ref 43–75)
NRBC BLD AUTO-RTO: 0 /100 WBCS
PLATELET # BLD AUTO: 272 THOUSANDS/UL (ref 149–390)
PMV BLD AUTO: 9.9 FL (ref 8.9–12.7)
POTASSIUM SERPL-SCNC: 4.1 MMOL/L (ref 3.5–5.3)
PROT SERPL-MCNC: 8.1 G/DL (ref 6.4–8.2)
RBC # BLD AUTO: 3.83 MILLION/UL (ref 3.88–5.62)
SODIUM SERPL-SCNC: 142 MMOL/L (ref 136–145)
WBC # BLD AUTO: 9.33 THOUSAND/UL (ref 4.31–10.16)

## 2019-12-22 PROCEDURE — 83690 ASSAY OF LIPASE: CPT | Performed by: EMERGENCY MEDICINE

## 2019-12-22 PROCEDURE — 74177 CT ABD & PELVIS W/CONTRAST: CPT

## 2019-12-22 PROCEDURE — 99284 EMERGENCY DEPT VISIT MOD MDM: CPT | Performed by: EMERGENCY MEDICINE

## 2019-12-22 PROCEDURE — 36415 COLL VENOUS BLD VENIPUNCTURE: CPT | Performed by: EMERGENCY MEDICINE

## 2019-12-22 PROCEDURE — 80053 COMPREHEN METABOLIC PANEL: CPT | Performed by: EMERGENCY MEDICINE

## 2019-12-22 PROCEDURE — 96372 THER/PROPH/DIAG INJ SC/IM: CPT

## 2019-12-22 PROCEDURE — 99284 EMERGENCY DEPT VISIT MOD MDM: CPT

## 2019-12-22 PROCEDURE — 85025 COMPLETE CBC W/AUTO DIFF WBC: CPT | Performed by: EMERGENCY MEDICINE

## 2019-12-22 RX ORDER — KETOROLAC TROMETHAMINE 30 MG/ML
15 INJECTION, SOLUTION INTRAMUSCULAR; INTRAVENOUS ONCE
Status: COMPLETED | OUTPATIENT
Start: 2019-12-22 | End: 2019-12-22

## 2019-12-22 RX ORDER — ACETAMINOPHEN 325 MG/1
975 TABLET ORAL ONCE
Status: COMPLETED | OUTPATIENT
Start: 2019-12-22 | End: 2019-12-22

## 2019-12-22 RX ADMIN — KETOROLAC TROMETHAMINE 15 MG: 30 INJECTION, SOLUTION INTRAMUSCULAR at 13:16

## 2019-12-22 RX ADMIN — IOHEXOL 100 ML: 350 INJECTION, SOLUTION INTRAVENOUS at 15:44

## 2019-12-22 RX ADMIN — ACETAMINOPHEN 975 MG: 325 TABLET ORAL at 13:15

## 2019-12-22 NOTE — ED ATTENDING ATTESTATION
12/22/2019  Kulwant Suarez DO, saw and evaluated the patient  I have discussed the patient with the resident/non-physician practitioner and agree with the resident's/non-physician practitioner's findings, Plan of Care, and MDM as documented in the resident's/non-physician practitioner's note, except where noted  All available labs and Radiology studies were reviewed  I was present for key portions of any procedure(s) performed by the resident/non-physician practitioner and I was immediately available to provide assistance  At this point I agree with the current assessment done in the Emergency Department  I have conducted an independent evaluation of this patient a history and physical is as follows:      60 yo male c/o 2d hx of LLQ abd pain intermittently radiating to the testicles  Woke up on 12/20/19 with symptoms  Have been constant since onset  Worse with sitting/standing, or twisting motions  No other a/e factors  Hasn't tried any medications to help his symptoms  Denies f/c/n/v/d  Normal stools per pt  No urinary sxs  Beads of sweat on chest   abd snd mild TTP LLQ, L side, LUQ, suprapubic region  No r/g bs+  Pain worsened when L hip flexed  Imp: L sided abd pain  ddx broad but most likely MSK vs diverticular dz plan: labs, CT, tx sx and reassess        ED Course         Critical Care Time  Procedures

## 2019-12-22 NOTE — ED PROVIDER NOTES
History  Chief Complaint   Patient presents with    Abdominal Pain     LLQ abd pain since Friday morning  Pt thinks he pulled a muscle from coughing "very, very bad" for the last 2 weeks  Patient is a 22-year-old male who presents for left lower abdominal discomfort  Patient says that he has been having the pain since Friday morning  He says that it has been progressive in nature  He describes it as an aching discomfort  He says that he Ortiz Peaches gets the pain when he moves, bends or twists    He says that last week he had a bad cough which has since resolved  He denies any loose stools or blood in his stools, dysuria, hematuria, increased urinary frequency, penile drainage, testicular tenderness, flank pain, upper abdominal pain, nausea, vomiting, chest pain, shortness of breath  The patient has not taken anything for the discomfort  Prior to Admission Medications   Prescriptions Last Dose Informant Patient Reported? Taking?    Icosapent Ethyl (VASCEPA) 1 g CAPS   No No   Sig: Take 4 capsules (4 g total) by mouth daily   Multiple Vitamins-Minerals (MULTIVITAMIN ADULT PO)  Self Yes No   Sig: Take 1 tablet by mouth daily   Na Sulfate-K Sulfate-Mg Sulf (SUPREP BOWEL PREP KIT) 17 5-3 13-1 6 GM/177ML SOLN   No No   Sig: Take 177 mL by mouth once for 1 dose   aspirin 81 MG tablet  Self Yes No   Sig: Take 81 mg by mouth daily   fenofibrate (TRICOR) 145 mg tablet  Self No No   Sig: TAKE 1 TABLET BY MOUTH ONCE DAILY AT BEDTIME   gabapentin (NEURONTIN) 300 mg capsule  Self Yes No   Sig: Take 300 mg by mouth daily   hydrochlorothiazide (MICROZIDE) 12 5 mg capsule  Self Yes No   Sig: Take 12 5 mg by mouth daily   latanoprost (XALATAN) 0 005 % ophthalmic solution  Self Yes No   Sig: Apply 1 drop to eye daily at bedtime   levothyroxine 25 mcg tablet  Self Yes No   lisinopril (ZESTRIL) 40 mg tablet   No No   Sig: Take 1 tablet (40 mg total) by mouth daily   metFORMIN (GLUCOPHAGE) 500 mg tablet  Self Yes No   Sig: Take 500 mg by mouth 4 (four) times a day (with meals and at bedtime) Take 2 tables at AM and 2 tablets PM   metoprolol tartrate (LOPRESSOR) 50 mg tablet  Self No No   Sig: TAKE 1 TABLET BY MOUTH TWICE DAILY      Facility-Administered Medications: None       Past Medical History:   Diagnosis Date    Cardiac disease     Colon polyp     Diabetes mellitus ([facility] )     Disease of thyroid gland     Hyperlipidemia     Hypertension     Myocardial infarct ([facility] )     2006       Past Surgical History:   Procedure Laterality Date    COLONOSCOPY         History reviewed  No pertinent family history  I have reviewed and agree with the history as documented  Social History     Tobacco Use    Smoking status: Current Some Day Smoker     Packs/day: 0 25    Smokeless tobacco: Current User   Substance Use Topics    Alcohol use: Yes     Comment: weekends    Drug use: No        Review of Systems   Constitutional: Negative for chills, fever and unexpected weight change  HENT: Negative for congestion, sore throat and trouble swallowing  Eyes: Negative for pain, discharge and itching  Respiratory: Negative for cough, chest tightness, shortness of breath and wheezing  Cardiovascular: Negative for chest pain, palpitations and leg swelling  Gastrointestinal: Positive for abdominal pain  Negative for blood in stool, diarrhea, nausea and vomiting  Endocrine: Negative for polyuria  Genitourinary: Negative for difficulty urinating, dysuria, frequency, hematuria, penile swelling, scrotal swelling and testicular pain  Musculoskeletal: Negative for arthralgias and back pain  Skin: Negative for color change and rash  Neurological: Negative for dizziness, syncope, weakness, light-headedness and headaches         Physical Exam  ED Triage Vitals [12/22/19 1256]   Temperature Pulse Respirations Blood Pressure SpO2   98 °F (36 7 °C) 77 18 151/87 100 %      Temp Source Heart Rate Source Patient Position - Orthostatic VS BP Location FiO2 (%)   Oral Monitor Lying Right arm --      Pain Score       7             Orthostatic Vital Signs  Vitals:    12/22/19 1256 12/22/19 1315 12/22/19 1428   BP: 151/87  151/88   Pulse: 77 76 66   Patient Position - Orthostatic VS: Lying  Lying       Physical Exam   Constitutional: He is oriented to person, place, and time  He appears well-developed and well-nourished  No distress  HENT:   Head: Normocephalic and atraumatic  Right Ear: External ear normal    Left Ear: External ear normal    Eyes: Pupils are equal, round, and reactive to light  Conjunctivae and EOM are normal    Neck: Normal range of motion  Cardiovascular: Normal rate, regular rhythm, normal heart sounds and intact distal pulses  Exam reveals no gallop and no friction rub  No murmur heard  Pulmonary/Chest: Effort normal and breath sounds normal  No respiratory distress  He has no wheezes  He has no rales  Abdominal: Soft  Bowel sounds are normal  He exhibits no distension  There is tenderness (Left lower/suprapubic)  There is no rigidity, no rebound, no guarding, no CVA tenderness, no tenderness at McBurney's point and negative Kelly's sign  Genitourinary: Testes normal and penis normal  Cremasteric reflex is present  Right testis shows no mass, no swelling and no tenderness  Left testis shows no mass, no swelling and no tenderness  Musculoskeletal: Normal range of motion  He exhibits no edema, tenderness or deformity  Lymphadenopathy:     He has no cervical adenopathy  Neurological: He is alert and oriented to person, place, and time  No cranial nerve deficit or sensory deficit  He exhibits normal muscle tone  Skin: Skin is warm and dry  Psychiatric: He has a normal mood and affect  His behavior is normal    Nursing note and vitals reviewed        ED Medications  Medications   ketorolac (TORADOL) injection 15 mg (15 mg Intramuscular Given 12/22/19 1316)   acetaminophen (TYLENOL) tablet 975 mg (975 mg Oral Given 12/22/19 1315)   iohexol (OMNIPAQUE) 350 MG/ML injection (MULTI-DOSE) 100 mL (100 mL Intravenous Given 12/22/19 1544)       Diagnostic Studies  Results Reviewed     Procedure Component Value Units Date/Time    Comprehensive metabolic panel [[id number]]  (Abnormal) Collected:  12/22/19 1336    Lab Status:  Final result Specimen:  Blood from Arm, Right Updated:  12/22/19 1409     Sodium 142 mmol/L      Potassium 4 1 mmol/L      Chloride 107 mmol/L      CO2 32 mmol/L      ANION GAP 3 mmol/L      BUN 25 mg/dL      Creatinine 1 27 mg/dL      Glucose 173 mg/dL      Calcium 10 4 mg/dL      AST 29 U/L      ALT 67 U/L      Alkaline Phosphatase 37 U/L      Total Protein 8 1 g/dL      Albumin 4 6 g/dL      Total Bilirubin 0 71 mg/dL      eGFR 61 ml/min/1 73sq m     Narrative:       [place] guidelines for Chronic Kidney Disease (CKD):     Stage 1 with normal or high GFR (GFR > 90 mL/min/1 73 square meters)    Stage 2 Mild CKD (GFR = 60-89 mL/min/1 73 square meters)    Stage 3A Moderate CKD (GFR = 45-59 mL/min/1 73 square meters)    Stage 3B Moderate CKD (GFR = 30-44 mL/min/1 73 square meters)    Stage 4 Severe CKD (GFR = 15-29 mL/min/1 73 square meters)    Stage 5 End Stage CKD (GFR <15 mL/min/1 73 square meters)  Note: GFR calculation is accurate only with a steady state creatinine    Lipase [[id number]]  (Normal) Collected:  12/22/19 1336    Lab Status:  Final result Specimen:  Blood from Arm, Right Updated:  12/22/19 1409     Lipase 114 u/L     CBC and differential [[id number]]  (Abnormal) Collected:  12/22/19 1336    Lab Status:  Final result Specimen:  Blood from Arm, Right Updated:  12/22/19 1354     WBC 9 33 Thousand/uL      RBC 3 83 Million/uL      Hemoglobin 13 0 g/dL      Hematocrit 38 6 %       fL      MCH 33 9 pg      MCHC 33 7 g/dL      RDW 11 3 %      MPV 9 9 fL      Platelets 804 Thousands/uL      nRBC 0 /100 WBCs      Neutrophils Relative 64 %      Immat GRANS % 1 % Lymphocytes Relative 22 %      Monocytes Relative 9 %      Eosinophils Relative 3 %      Basophils Relative 1 %      Neutrophils Absolute 6 01 Thousands/µL      Immature Grans Absolute 0 05 Thousand/uL      Lymphocytes Absolute 2 08 Thousands/µL      Monocytes Absolute 0 88 Thousand/µL      Eosinophils Absolute 0 24 Thousand/µL      Basophils Absolute 0 07 Thousands/µL                  CT abdomen pelvis with contrast   Final Result by Rachel Davies MD (12/22 1605)      1  Findings consistent with epiploic appendagitis at the descending colon  2   Hepatomegaly and steatosis, not substantially changed  The study was marked in [facility] for immediate notification  Workstation performed: [id number]               Procedures  Procedures      ED Course                               MDM  Number of Diagnoses or Management Options  Diagnosis management comments: This 51-year-old male presenting for left lower quadrant/suprapubic abdominal discomfort since Friday morning  Only feels pain when he bends, moves or twists  No loose stools, blood in his stools, urinary symptoms  No obvious hernias appreciated on exam   Believe patient's symptoms are secondary to musculoskeletal strain        Disposition  Final diagnoses:   Abdominal pain   Epiploic appendagitis     Time reflects when diagnosis was documented in both MDM as applicable and the Disposition within this note     Time User Action Codes Description Comment    12/22/2019  4:09 PM Gerrit Romberg Add [R10 9] Abdominal pain     12/22/2019  4:09 PM Gerrit Romberg Add [K52 9] Epiploic appendagitis       ED Disposition     ED Disposition Condition Date/Time Comment    Discharge Stable Sun Dec 22, 2019  4:09 PM Sinai Gage discharge to home/self care              Follow-up Information     Follow up With Specialties Details Why Contact Info    Asiya Valera MD Family Medicine Schedule an appointment as soon as possible for a visit  For follow-up of symptoms [id number] [facility] [phone]  [phone]  [address] [facility] [address]            Patient's Medications   Discharge Prescriptions    No medications on file     No discharge procedures on file  ED Provider  Attending physically available and evaluated Troy Wise I managed the patient along with the ED Attending      Electronically Signed by         Juan Arshad DO  12/22/19 8752

## 2020-03-04 ENCOUNTER — TELEPHONE (OUTPATIENT)
Dept: GASTROENTEROLOGY | Facility: MEDICAL CENTER | Age: 60
End: 2020-03-04

## 2020-03-04 NOTE — TELEPHONE ENCOUNTER
Unable to reach the patient to see if he would like to reschedule his colon/egd with DR Marilyn Dukes  Letter mailed